# Patient Record
Sex: MALE | Race: WHITE | NOT HISPANIC OR LATINO | Employment: OTHER | URBAN - METROPOLITAN AREA
[De-identification: names, ages, dates, MRNs, and addresses within clinical notes are randomized per-mention and may not be internally consistent; named-entity substitution may affect disease eponyms.]

---

## 2024-02-05 ENCOUNTER — TRANSFERRED RECORDS (OUTPATIENT)
Dept: HEALTH INFORMATION MANAGEMENT | Facility: CLINIC | Age: 64
End: 2024-02-05

## 2024-02-05 LAB
ALT SERPL-CCNC: 23 U/L
AST SERPL-CCNC: 22 U/L (ref 17–59)
CHOLESTEROL (EXTERNAL): 236 MG/DL (ref 0–200)
CREATININE (EXTERNAL): 0.6 MG/DL (ref 0.7–1.4)
GFR ESTIMATED (EXTERNAL): >60 ML/MIN/1.73M2
GLUCOSE (EXTERNAL): 106 MG/DL (ref 60–99)
HDLC SERPL-MCNC: 54 MG/DL (ref 35–65)
LDL CHOLESTEROL CALCULATED (EXTERNAL): 147 MG/DL (ref 0–130)
POTASSIUM (EXTERNAL): 4.2 MMOL/L (ref 3.5–5.1)
TRIGLYCERIDES (EXTERNAL): 173 MG/DL (ref 0–200)
TSH SERPL-ACNC: 1.16 MIU/L (ref 0.47–4.68)

## 2024-02-15 ENCOUNTER — MEDICAL CORRESPONDENCE (OUTPATIENT)
Dept: SCHEDULING | Facility: CLINIC | Age: 64
End: 2024-02-15
Payer: COMMERCIAL

## 2024-02-20 ENCOUNTER — TELEPHONE (OUTPATIENT)
Dept: INTERVENTIONAL RADIOLOGY/VASCULAR | Facility: HOSPITAL | Age: 64
End: 2024-02-20
Payer: COMMERCIAL

## 2024-02-20 NOTE — TELEPHONE ENCOUNTER
INTERVENTIONAL RADIOLOGY INSTRUCTIONS     You are scheduled for an upcoming procedure in the   Interventional Radiology Department at [facility].     Date:  Friday March 8     Procedure: MRI with IV Sedation     Address: [place] [address]     Free parking is available for patients & visitors in Lot A or B.  Lot A is closest to the main entrance of hospital.    Check in at main entrance WELCOME DESK.        Check into the Radiology Department at: 07:00 am    On the date of procedure, please do not eat any food after: 11:00 pm    On the date of this procedure, you may drink clear liquids until 05:00 am     Clear liquid examples are: water, apple juice, black coffee or tea (no cream, sugar or milk), Gatorade & Jello.        You will need to have someone available to drive you home.     A ride share service ([employer], [employer]) does not qualify for transportation home unless you have another adult accompanying you home.    We recommend that you do not not drive, or operate heavy machinery for 24 hours after receiving sedation for this procedure.     We recommend that you have a responsible adult stay with you for 24 hours after you get home.    Please bring list of current medications to your appointment.    Please review your current medications with your primary care provider and follow the advice given for holding any medications prior to this procedure.    You are required to have a pre-operative physical exam (H&P) by a healthcare provider in the 30 day period prior to your scheduled procedure.  Please contact your primary care team for this appointment.  If you are planning on having this pre-operative exam done outside of [facility], please fax the documentation to [phone].    Please confirm that you have received these instructions by replying to this [facility] [employer] message, or if you have any  questions, please call the Interventional Radiology team at [phone].       Thank you!    Kelsi WILLARD  Interventional Radiology Intake Nurse Coordinator  [phone]

## 2024-02-20 NOTE — TELEPHONE ENCOUNTER
Writer has spoken with Yash regarding planned procedure with IR via telephone.      Yash acknowledges understanding of pre-procedure instructions.     He states he has 1 known allergy to WELLBUTRIN.  He is not currently prescribed medications for diabetes.    I have provided Yash with IR number ([phone]) for questions or concerns.    Yash will pursue scheduling the required pre-operative physical and have that documentation faxed to IR prior to procedure date on 3/8/2024.    Kelsi WILLARD  Interventional Radiology RN   [phone]

## 2024-02-23 NOTE — TELEPHONE ENCOUNTER
Patient called, concerned he hasn't received mailed instructions for MRI with sedation that were sent 2/20/24.    Reviewed pre-procedure instructions over the phone. All questions answered. His pre-op is scheduled 2/26/24 and has our fax number to send a copy once completed.      Julisa Swanson RN  Interventional Radiology  [phone]

## 2024-02-26 ENCOUNTER — TRANSFERRED RECORDS (OUTPATIENT)
Dept: HEALTH INFORMATION MANAGEMENT | Facility: CLINIC | Age: 64
End: 2024-02-26
Payer: COMMERCIAL

## 2024-03-06 ENCOUNTER — TRANSFERRED RECORDS (OUTPATIENT)
Dept: HEALTH INFORMATION MANAGEMENT | Facility: CLINIC | Age: 64
End: 2024-03-06
Payer: COMMERCIAL

## 2024-03-08 ENCOUNTER — HOSPITAL ENCOUNTER (OUTPATIENT)
Dept: MRI IMAGING | Facility: HOSPITAL | Age: 64
Discharge: HOME | End: 2024-03-08
Payer: MEDICARE

## 2024-03-08 VITALS
DIASTOLIC BLOOD PRESSURE: 70 MMHG | SYSTOLIC BLOOD PRESSURE: 114 MMHG | OXYGEN SATURATION: 95 % | HEIGHT: 73 IN | RESPIRATION RATE: 14 BRPM | BODY MASS INDEX: 38.43 KG/M2 | WEIGHT: 290 LBS | TEMPERATURE: 97.9 F | HEART RATE: 76 BPM

## 2024-03-08 DIAGNOSIS — M54.50 LOW BACK PAIN: ICD-10-CM

## 2024-03-08 DIAGNOSIS — M48.061 SPINAL STENOSIS OF LUMBAR REGION: ICD-10-CM

## 2024-03-08 PROCEDURE — 99152 MOD SED SAME PHYS/QHP 5/>YRS: CPT

## 2024-03-08 PROCEDURE — 250N000011 HC RX IP 250 OP 636: Performed by: RADIOLOGY

## 2024-03-08 PROCEDURE — 99153 MOD SED SAME PHYS/QHP EA: CPT

## 2024-03-08 RX ORDER — OXYCODONE HYDROCHLORIDE 5 MG/1
5 TABLET ORAL EVERY 4 HOURS
COMMUNITY

## 2024-03-08 RX ORDER — IBUPROFEN 400 MG/1
600 TABLET, FILM COATED ORAL EVERY 6 HOURS PRN
COMMUNITY

## 2024-03-08 RX ORDER — NALOXONE HYDROCHLORIDE 0.4 MG/ML
0.4 INJECTION, SOLUTION INTRAMUSCULAR; INTRAVENOUS; SUBCUTANEOUS
Status: DISCONTINUED | OUTPATIENT
Start: 2024-03-08 | End: 2024-03-09 | Stop reason: HOSPADM

## 2024-03-08 RX ORDER — ACETAMINOPHEN 325 MG/1
325-650 TABLET ORAL EVERY 4 HOURS PRN
COMMUNITY

## 2024-03-08 RX ORDER — GABAPENTIN 300 MG/1
600 CAPSULE ORAL AT BEDTIME
COMMUNITY
Start: 2024-02-29 | End: 2024-05-28

## 2024-03-08 RX ORDER — NALOXONE HYDROCHLORIDE 0.4 MG/ML
0.2 INJECTION, SOLUTION INTRAMUSCULAR; INTRAVENOUS; SUBCUTANEOUS
Status: DISCONTINUED | OUTPATIENT
Start: 2024-03-08 | End: 2024-03-09 | Stop reason: HOSPADM

## 2024-03-08 RX ORDER — FENTANYL CITRATE 50 UG/ML
25-50 INJECTION, SOLUTION INTRAMUSCULAR; INTRAVENOUS EVERY 5 MIN PRN
Status: DISCONTINUED | OUTPATIENT
Start: 2024-03-08 | End: 2024-03-09 | Stop reason: HOSPADM

## 2024-03-08 RX ORDER — FLUMAZENIL 0.1 MG/ML
0.2 INJECTION, SOLUTION INTRAVENOUS
Status: DISCONTINUED | OUTPATIENT
Start: 2024-03-08 | End: 2024-03-09 | Stop reason: HOSPADM

## 2024-03-08 RX ADMIN — MIDAZOLAM HYDROCHLORIDE 3.5 MG: 1 INJECTION, SOLUTION INTRAMUSCULAR; INTRAVENOUS at 10:43

## 2024-03-08 RX ADMIN — FENTANYL CITRATE 125 MCG: 50 INJECTION, SOLUTION INTRAMUSCULAR; INTRAVENOUS at 10:42

## 2024-03-08 NOTE — PRE-PROCEDURE
GENERAL PRE-PROCEDURE:   Procedure:  Moderate sedation for MRI  Date/Time:  3/8/2024 9:26 AM    Verbal consent obtained?: Yes    Written consent obtained?: Yes    Risks and benefits: Risks, benefits and alternatives were discussed    DC Plan: Appropriate discharge home plan in place for patients who are going home after procedure   Consent given by:  Patient  Patient states understanding of procedure being performed: Yes    Patient's understanding of procedure matches consent: Yes    Procedure consent matches procedure scheduled: Yes    Expected level of sedation:  Moderate  Appropriately NPO:  Yes  ASA Class:  2  Mallampati  :  Grade 1- soft palate, uvula, tonsillar pillars, and posterior pharyngeal wall visible  Lungs:  Lungs clear with good breath sounds bilaterally  Heart:  Normal heart sounds and rate  History & Physical reviewed:  History and physical reviewed and no updates needed  Statement of review:  I have reviewed the lab findings, diagnostic data, medications, and the plan for sedation

## 2024-03-08 NOTE — DISCHARGE INSTRUCTIONS
* Recovery After Conscious Sedation (Adult)  We gave you medicine by vein to make you sleepy or relaxed during your procedure. This may have included both a pain medicine and sleeping medicine. Most of the effects have worn off. But you may still feel sleepy for the next 6 to 8 hours.  Home care  Follow these guidelines when you get home:  You may feel sleepy and clumsy and have poor balance for the next few hours.  A responsible adult should stay with you for the next 8 hours. This person should make sure your condition doesn t get worse.  Don't drink any alcohol for the next 24 hours.  Don't drive, operate dangerous machinery, make important business or personal decisions or sign legal documents during the next 24 hours.  You may vomit (throw up) if you eat too soon after the procedure. If this happens, drink small amounts of water, juice or clear broth. Wait to try solid food until you no longer have nausea (upset stomach).  Note: Your care team may tell you not to take any medicine by mouth for pain or sleep in the next 4 hours. These medicines may react with the medicines you had in the hospital. This could cause a much stronger response than usual.  Follow-up care  Follow up with your care team if you are not alert and back to your usual level of activity within 12 hours.  When to seek medical advice  Call your care team right away if any of these occur:  You still feel sleepy or clumsy after 12 hours, or your sleepiness gets worse  Weakness or dizziness gets worse  Repeated vomiting  If you can't be woken up and someone is staying with you, they should call 911.  For informational purposes only. Not to replace the advice of your health care provider.  Copyright   2018 [employer]. All rights reserved.

## 2024-03-08 NOTE — LETTER
February 20, 2024    Yash Ribera  [address] [address]      INTERVENTIONAL RADIOLOGY INSTRUCTIONS     You are scheduled for an upcoming procedure in the   Interventional Radiology Department at [facility].     Date:  Friday March 8     Procedure: MRI with IV Sedation     Address: [place] [address]             Free parking is available for patients & visitors in Lot A or B.  Lot A is closest to the main entrance of hospital.     Check in at main entrance WELCOME DESK.          Check into the Radiology Department at: 07:00 am     On the date of procedure, please do not eat any food after: 11:00 pm     On the date of this procedure, you may drink clear liquids until 05:00 am      Clear liquid examples are: water, apple juice, black coffee or tea (no cream, sugar or milk), Gatorade & Jello.           You will need to have someone available to drive you home.      A ride share service ([employer], [employer], [employer]) does not qualify for transportation home unless you have another adult accompanying you home.     We recommend that you do not not drive, or operate heavy machinery for 24 hours after receiving sedation for this procedure.      We recommend that you have a responsible adult stay with you for 24 hours after you get home.     Please bring list of current medications to your appointment.     Please review your current medications with your primary care provider and follow the advice given for holding any medications prior to this procedure.     You are required to have a pre-operative physical exam (H&P) by a healthcare provider in the 30 day period prior to your scheduled procedure.  Please contact your primary care team for this appointment.  If you are planning on having this pre-operative exam done outside of [facility], please fax the documentation to [phone].      Please confirm that you have received these instructions by replying to this [employer] message, or if you have any questions, please call the Interventional Radiology team at [phone].        Thank you!     Kelsi WILLARD  Interventional Radiology Intake Nurse Coordinator  [phone]

## 2024-03-19 ENCOUNTER — MEDICAL CORRESPONDENCE (OUTPATIENT)
Dept: HEALTH INFORMATION MANAGEMENT | Facility: CLINIC | Age: 64
End: 2024-03-19
Payer: COMMERCIAL

## 2024-03-31 ENCOUNTER — HEALTH MAINTENANCE LETTER (OUTPATIENT)
Age: 64
End: 2024-03-31

## 2024-04-03 ENCOUNTER — OFFICE VISIT (OUTPATIENT)
Dept: CARDIOLOGY | Facility: CLINIC | Age: 64
End: 2024-04-03
Payer: COMMERCIAL

## 2024-04-03 VITALS
DIASTOLIC BLOOD PRESSURE: 76 MMHG | HEIGHT: 73 IN | SYSTOLIC BLOOD PRESSURE: 120 MMHG | RESPIRATION RATE: 14 BRPM | BODY MASS INDEX: 39.36 KG/M2 | WEIGHT: 297 LBS | HEART RATE: 74 BPM

## 2024-04-03 DIAGNOSIS — I47.10 PAROXYSMAL SUPRAVENTRICULAR TACHYCARDIA (H): ICD-10-CM

## 2024-04-03 DIAGNOSIS — R06.09 EXERTIONAL DYSPNEA: ICD-10-CM

## 2024-04-03 DIAGNOSIS — R07.9 CHEST PAIN, UNSPECIFIED TYPE: ICD-10-CM

## 2024-04-03 DIAGNOSIS — I47.29 NSVT (NONSUSTAINED VENTRICULAR TACHYCARDIA) (H): Primary | ICD-10-CM

## 2024-04-03 DIAGNOSIS — Z91.041 CONTRAST MEDIA ALLERGY: ICD-10-CM

## 2024-04-03 DIAGNOSIS — R94.31 NONSPECIFIC ABNORMAL ELECTROCARDIOGRAM (ECG) (EKG): ICD-10-CM

## 2024-04-03 PROCEDURE — 99204 OFFICE O/P NEW MOD 45 MIN: CPT | Performed by: INTERNAL MEDICINE

## 2024-04-03 RX ORDER — METHYLPREDNISOLONE 32 MG/1
32 TABLET ORAL DAILY
Qty: 1 TABLET | Refills: 0 | Status: SHIPPED | OUTPATIENT
Start: 2024-04-03

## 2024-04-03 RX ORDER — METOPROLOL SUCCINATE 25 MG/1
25 TABLET, EXTENDED RELEASE ORAL DAILY
Qty: 90 TABLET | Refills: 3 | Status: SHIPPED | OUTPATIENT
Start: 2024-04-03

## 2024-04-03 RX ORDER — DIPHENHYDRAMINE HCL 50 MG
50 CAPSULE ORAL EVERY 6 HOURS PRN
Qty: 1 CAPSULE | Refills: 1 | Status: SHIPPED | OUTPATIENT
Start: 2024-04-03

## 2024-04-03 RX ORDER — DIPHENHYDRAMINE HCL 25 MG
25 CAPSULE ORAL EVERY 6 HOURS PRN
Qty: 1 CAPSULE | Refills: 1 | Status: SHIPPED | OUTPATIENT
Start: 2024-04-03

## 2024-04-03 NOTE — PROGRESS NOTES
"  HEART CARE ENCOUNTER CONSULTATON NOTE      [facility]  [phone]      Assessment/Recommendations   Assessment:   Chest pain  2.  Exertional dyspnea  3.  Nonsustained ventricular tachycardia on monitor 4 beats  4.  Atrial tachycardia, short duration of monitor  5.  Possible allergy to contrast media  6.  Abnormal EKG  7.  Symptomatic PVCs  8.  Chronic back pain, requiring use of a wheeled walker    Plan:   Recommend coronary angiogram to assess for obstructive coronary artery disease given dyspnea exertion and chest discomfort  2.  Recommend complete echocardiogram to assess left ventricular function given nonsustained ventricular tachycardia and dyspnea on exertion.  3.  Start metoprolol 25 mg daily for symptomatic PVCs and NSVT  4.  Pretreat with Medrol and Benadryl prior to CT coronary       History of Present Illness/Subjective    HPI: Yash Ribera is a 63 year old male who presents to cardiology clinic with palpitations, dyspnea exertion and intermittent chest discomfort.    For the past few months he been noticing increasing episodes of palpitations and occasional chest discomfort associated with dyspnea on exertion.  Patient has a very low functional status due to lower back pain and needs to use a wheeled walker.  With activity he notes dyspnea on exertion, palpitations and occasional chest discomfort.    No prior history of coronary artery disease.    Patient does note some mild lower extremity edema.  Denies any significant orthopnea symptoms.    Echocardiogram Results: Pending       Physical Examination  Review of Systems   Vitals: /76 (BP Location: Right arm, Patient Position: Sitting, Cuff Size: Adult Large)   Pulse 74   Resp 14   Ht 1.854 m (6' 1\")   Wt 134.7 kg (297 lb)   BMI 39.18 kg/m    BMI= Body mass index is 39.18 kg/m .  Wt Readings from Last 3 Encounters:   04/03/24 134.7 kg (297 lb)   03/08/24 131.5 kg (290 lb)        Pleasant, using wheeled walker, " obesity noted   ENT/Mouth: membranes moist, no oral lesions or bleeding gums.      EYES:  no scleral icterus, normal conjunctivae       Chest/Lungs:   lungs are clear to auscultation, no rales or wheezing, no sternal scar, equal chest wall expansion    Cardiovascular:   Regular. Normal first and second heart sounds with no murmurs, rubs, or gallops; the carotid, radial and posterior tibial pulses are intact, Jugular venous pressure normal, mild edema bilaterally    Abdomen:  no tenderness; bowel sounds are present   Extremities: no cyanosis or clubbing   Skin: no xanthelasma, warm.    Neurologic: normal  bilateral, no tremors     Psychiatric: alert and oriented x3, calm        Please refer above for cardiac ROS details.        Medical History  Surgical History Family History Social History   Past Medical History:   Diagnosis Date    Back pain     Gastroesophageal reflux disease with esophagitis      Past Surgical History:   Procedure Laterality Date    BACK SURGERY      NECK SURGERY      ORTHOPEDIC SURGERY       No family history on file.     Social History     Socioeconomic History    Marital status:      Spouse name: Not on file    Number of children: Not on file    Years of education: Not on file    Highest education level: Not on file   Occupational History    Not on file   Tobacco Use    Smoking status: Former     Types: Cigarettes    Smokeless tobacco: Never   Vaping Use    Vaping Use: Never used   Substance and Sexual Activity    Alcohol use: Yes     Comment: 3 times/week    Drug use: Never    Sexual activity: Not on file   Other Topics Concern    Not on file   Social History Narrative    Not on file     Social Determinants of Health     Financial Resource Strain: Not on file   Food Insecurity: Not on file   Transportation Needs: Not on file   Physical Activity: Not on file   Stress: Not on file   Social Connections: Not on file   Interpersonal Safety: Not on file   Housing Stability: Not on file  "          Medications  Allergies   Current Outpatient Medications   Medication Sig Dispense Refill    acetaminophen (TYLENOL) 325 MG tablet Take 325-650 mg by mouth every 4 hours as needed for pain      aspirin-acetaminophen-caffeine (EXCEDRIN MIGRAINE) 250-250-65 MG tablet Take 2 tablets by mouth daily as needed      diphenhydrAMINE (BENADRYL ALLERGY) 25 MG capsule Take 1 capsule (25 mg) by mouth every 6 hours as needed for itching or allergies Administer 30 min - 2 hours pre - IV contrast injection and Patient must have a . 1 capsule 1    diphenhydrAMINE (BENADRYL) 50 MG capsule Take 1 capsule (50 mg) by mouth every 6 hours as needed for itching or allergies Administer 1 hour pre - IV contrast injection and patient must have a . 1 capsule 1    gabapentin (NEURONTIN) 300 MG capsule Take 600 mg by mouth at bedtime      ibuprofen (ADVIL/MOTRIN) 400 MG tablet Take 600 mg by mouth every 6 hours as needed      methylPREDNISolone (MEDROL) 32 MG tablet Take 1 tablet (32 mg) by mouth daily 12 hours prior to the procedure with IV contrast 1 tablet 0    methylPREDNISolone (MEDROL) 32 MG tablet Take 1 tablet (32 mg) by mouth daily 2 hours prior to the procedure with IV contrast 1 tablet 0    metoprolol succinate ER (TOPROL XL) 25 MG 24 hr tablet Take 1 tablet (25 mg) by mouth daily 90 tablet 3    oxyCODONE (ROXICODONE) 5 MG tablet Take 5 mg by mouth every 4 hours         Allergies   Allergen Reactions    Wellbutrin [Bupropion] Hives    Bee Venom      Other Reaction(s): Edema,generalized    Bees     Iodinated Contrast Media Other (See Comments)    Pollen Extract     Shrimp     Shrimp (Diagnostic) Unknown          Lab Results    Chemistry/lipid CBC Cardiac Enzymes/BNP/TSH/INR   No results for input(s): \"CHOL\", \"HDL\", \"LDL\", \"TRIG\", \"CHOLHDLRATIO\" in the last 41279 hours.  No results for input(s): \"LDL\" in the last 34346 hours.  No results for input(s): \"NA\", \"POTASSIUM\", \"CHLORIDE\", \"CO2\", \"GLC\", \"BUN\", \"CR\", " "\"GFRESTIMATED\", \"DAIN\" in the last 50529 hours.    Invalid input(s): \"GRFESTBLACK\"  No results for input(s): \"CR\" in the last 70903 hours.  No results for input(s): \"A1C\" in the last 60855 hours.       No results for input(s): \"WBC\", \"HGB\", \"HCT\", \"MCV\", \"PLT\" in the last 90867 hours.  No results for input(s): \"HGB\" in the last 84764 hours. No results for input(s): \"TROPONINI\" in the last 37974 hours.  No results for input(s): \"BNP\", \"NTBNPI\", \"NTBNP\" in the last 98656 hours.  No results for input(s): \"TSH\" in the last 72978 hours.  No results for input(s): \"INR\" in the last 85484 hours.     Travis Alexandre DO      Today's clinic visit entailed:  45 minutes spent by me on the date of the encounter doing chart review, history and exam, documentation and further activities per the note                            "

## 2024-04-03 NOTE — PATIENT INSTRUCTIONS
Please contact direct nurses line Monday through Friday 8 AM to 5 PM @ [phone]    After-hours contact cardiology office at [phone].    Plan:    Coronary CT angiogram    Echocardiogram    Start metoprolol for non-sustained ventricular tachycardia and Svt.

## 2024-04-03 NOTE — LETTER
"4/3/2024    Pérez Xavier MD  [facility] [address] [place] [id number]    RE: Yash Ribera       Dear Colleague,     I had the pleasure of seeing Yash Ribera in the [facility].    HEART CARE ENCOUNTER CONSULTATON NOTE      [facility]  [phone]      Assessment/Recommendations   Assessment:   Chest pain  2.  Exertional dyspnea  3.  Nonsustained ventricular tachycardia on monitor 4 beats  4.  Atrial tachycardia, short duration of monitor  5.  Possible allergy to contrast media  6.  Abnormal EKG  7.  Symptomatic PVCs  8.  Chronic back pain, requiring use of a wheeled walker    Plan:   Recommend coronary angiogram to assess for obstructive coronary artery disease given dyspnea exertion and chest discomfort  2.  Recommend complete echocardiogram to assess left ventricular function given nonsustained ventricular tachycardia and dyspnea on exertion.  3.  Start metoprolol 25 mg daily for symptomatic PVCs and NSVT  4.  Pretreat with Medrol and Benadryl prior to CT coronary       History of Present Illness/Subjective    HPI: Yash Ribera is a 63 year old male who presents to cardiology clinic with palpitations, dyspnea exertion and intermittent chest discomfort.    For the past few months he been noticing increasing episodes of palpitations and occasional chest discomfort associated with dyspnea on exertion.  Patient has a very low functional status due to lower back pain and needs to use a wheeled walker.  With activity he notes dyspnea on exertion, palpitations and occasional chest discomfort.    No prior history of coronary artery disease.    Patient does note some mild lower extremity edema.  Denies any significant orthopnea symptoms.    Echocardiogram Results: Pending       Physical Examination  Review of Systems   Vitals: /76 (BP Location: Right arm, Patient Position: Sitting, Cuff Size: Adult Large)   Pulse 74   Resp 14   Ht 1.854 m (6' 1\")  "  Wt 134.7 kg (297 lb)   BMI 39.18 kg/m    BMI= Body mass index is 39.18 kg/m .  Wt Readings from Last 3 Encounters:   04/03/24 134.7 kg (297 lb)   03/08/24 131.5 kg (290 lb)        Pleasant, using wheeled walker, obesity noted   ENT/Mouth: membranes moist, no oral lesions or bleeding gums.      EYES:  no scleral icterus, normal conjunctivae       Chest/Lungs:   lungs are clear to auscultation, no rales or wheezing, no sternal scar, equal chest wall expansion    Cardiovascular:   Regular. Normal first and second heart sounds with no murmurs, rubs, or gallops; the carotid, radial and posterior tibial pulses are intact, Jugular venous pressure normal, mild edema bilaterally    Abdomen:  no tenderness; bowel sounds are present   Extremities: no cyanosis or clubbing   Skin: no xanthelasma, warm.    Neurologic: normal  bilateral, no tremors     Psychiatric: alert and oriented x3, calm        Please refer above for cardiac ROS details.        Medical History  Surgical History Family History Social History   Past Medical History:   Diagnosis Date    Back pain     Gastroesophageal reflux disease with esophagitis      Past Surgical History:   Procedure Laterality Date    BACK SURGERY      NECK SURGERY      ORTHOPEDIC SURGERY       No family history on file.     Social History     Socioeconomic History    Marital status:      Spouse name: Not on file    Number of children: Not on file    Years of education: Not on file    Highest education level: Not on file   Occupational History    Not on file   Tobacco Use    Smoking status: Former     Types: Cigarettes    Smokeless tobacco: Never   Vaping Use    Vaping Use: Never used   Substance and Sexual Activity    Alcohol use: Yes     Comment: 3 times/week    Drug use: Never    Sexual activity: Not on file   Other Topics Concern    Not on file   Social History Narrative    Not on file     Social Determinants of Health     Financial Resource Strain: Not on file   Food  Insecurity: Not on file   Transportation Needs: Not on file   Physical Activity: Not on file   Stress: Not on file   Social Connections: Not on file   Interpersonal Safety: Not on file   Housing Stability: Not on file           Medications  Allergies   Current Outpatient Medications   Medication Sig Dispense Refill    acetaminophen (TYLENOL) 325 MG tablet Take 325-650 mg by mouth every 4 hours as needed for pain      aspirin-acetaminophen-caffeine (EXCEDRIN MIGRAINE) 250-250-65 MG tablet Take 2 tablets by mouth daily as needed      diphenhydrAMINE (BENADRYL ALLERGY) 25 MG capsule Take 1 capsule (25 mg) by mouth every 6 hours as needed for itching or allergies Administer 30 min - 2 hours pre - IV contrast injection and Patient must have a . 1 capsule 1    diphenhydrAMINE (BENADRYL) 50 MG capsule Take 1 capsule (50 mg) by mouth every 6 hours as needed for itching or allergies Administer 1 hour pre - IV contrast injection and patient must have a . 1 capsule 1    gabapentin (NEURONTIN) 300 MG capsule Take 600 mg by mouth at bedtime      ibuprofen (ADVIL/MOTRIN) 400 MG tablet Take 600 mg by mouth every 6 hours as needed      methylPREDNISolone (MEDROL) 32 MG tablet Take 1 tablet (32 mg) by mouth daily 12 hours prior to the procedure with IV contrast 1 tablet 0    methylPREDNISolone (MEDROL) 32 MG tablet Take 1 tablet (32 mg) by mouth daily 2 hours prior to the procedure with IV contrast 1 tablet 0    metoprolol succinate ER (TOPROL XL) 25 MG 24 hr tablet Take 1 tablet (25 mg) by mouth daily 90 tablet 3    oxyCODONE (ROXICODONE) 5 MG tablet Take 5 mg by mouth every 4 hours         Allergies   Allergen Reactions    Wellbutrin [Bupropion] Hives    Bee Venom      Other Reaction(s): Edema,generalized    Bees     Iodinated Contrast Media Other (See Comments)    Pollen Extract     Shrimp     Shrimp (Diagnostic) Unknown          Lab Results    Chemistry/lipid CBC Cardiac Enzymes/BNP/TSH/INR   No results for input(s):  "\"CHOL\", \"HDL\", \"LDL\", \"TRIG\", \"CHOLHDLRATIO\" in the last 39909 hours.  No results for input(s): \"LDL\" in the last 97712 hours.  No results for input(s): \"NA\", \"POTASSIUM\", \"CHLORIDE\", \"CO2\", \"GLC\", \"BUN\", \"CR\", \"GFRESTIMATED\", \"DAIN\" in the last 32521 hours.    Invalid input(s): \"GRFESTBLACK\"  No results for input(s): \"CR\" in the last 71032 hours.  No results for input(s): \"A1C\" in the last 61900 hours.       No results for input(s): \"WBC\", \"HGB\", \"HCT\", \"MCV\", \"PLT\" in the last 13462 hours.  No results for input(s): \"HGB\" in the last 32002 hours. No results for input(s): \"TROPONINI\" in the last 50781 hours.  No results for input(s): \"BNP\", \"NTBNPI\", \"NTBNP\" in the last 11147 hours.  No results for input(s): \"TSH\" in the last 48579 hours.  No results for input(s): \"INR\" in the last 25583 hours.     Travis Alexandre DO      Today's clinic visit entailed:  45 minutes spent by me on the date of the encounter doing chart review, history and exam, documentation and further activities per the note    Thank you for allowing me to participate in the care of your patient.      Sincerely,     Travis Alexandre DO     [facility]  cc:   No referring provider defined for this encounter.      "

## 2024-04-04 ENCOUNTER — TELEPHONE (OUTPATIENT)
Dept: CARDIOLOGY | Facility: CLINIC | Age: 64
End: 2024-04-04

## 2024-04-04 ENCOUNTER — ANCILLARY PROCEDURE (OUTPATIENT)
Dept: CARDIOLOGY | Facility: CLINIC | Age: 64
End: 2024-04-04
Attending: INTERNAL MEDICINE
Payer: COMMERCIAL

## 2024-04-04 DIAGNOSIS — I47.29 NSVT (NONSUSTAINED VENTRICULAR TACHYCARDIA) (H): ICD-10-CM

## 2024-04-04 DIAGNOSIS — R94.31 NONSPECIFIC ABNORMAL ELECTROCARDIOGRAM (ECG) (EKG): ICD-10-CM

## 2024-04-04 DIAGNOSIS — I47.10 PAROXYSMAL SUPRAVENTRICULAR TACHYCARDIA (H): ICD-10-CM

## 2024-04-04 DIAGNOSIS — R06.09 EXERTIONAL DYSPNEA: ICD-10-CM

## 2024-04-04 DIAGNOSIS — R07.9 CHEST PAIN, UNSPECIFIED TYPE: ICD-10-CM

## 2024-04-04 PROCEDURE — 93306 TTE W/DOPPLER COMPLETE: CPT | Performed by: INTERNAL MEDICINE

## 2024-04-04 RX ADMIN — Medication 4 ML: at 15:31

## 2024-04-11 ENCOUNTER — TELEPHONE (OUTPATIENT)
Dept: CARDIOLOGY | Facility: CLINIC | Age: 64
End: 2024-04-11
Payer: COMMERCIAL

## 2024-04-11 DIAGNOSIS — R07.9 CHEST PAIN, UNSPECIFIED TYPE: ICD-10-CM

## 2024-04-11 DIAGNOSIS — R06.09 EXERTIONAL DYSPNEA: ICD-10-CM

## 2024-04-11 DIAGNOSIS — I47.29 NSVT (NONSUSTAINED VENTRICULAR TACHYCARDIA) (H): Primary | ICD-10-CM

## 2024-04-11 DIAGNOSIS — R94.31 NONSPECIFIC ABNORMAL ELECTROCARDIOGRAM (ECG) (EKG): ICD-10-CM

## 2024-04-11 DIAGNOSIS — I47.10 PAROXYSMAL SUPRAVENTRICULAR TACHYCARDIA (H): ICD-10-CM

## 2024-04-11 DIAGNOSIS — Z91.041 CONTRAST MEDIA ALLERGY: ICD-10-CM

## 2024-04-11 NOTE — TELEPHONE ENCOUNTER
STAT order placed.  notified of order priority change. Pre-treatment for dye allergy already prescribed. YOHANNES,Rn

## 2024-04-11 NOTE — TELEPHONE ENCOUNTER
----- Message from Travis Alexandre DO sent at 4/10/2024  4:52 PM CDT -----  Regarding: RE: Bettie - CCTA  Yes order stat  ----- Message -----  From: Matty Smallwood, RN  Sent: 4/10/2024  12:51 PM CDT  To: Travis Alexandre DO  Subject: FW: Bettie - CCTA                               Dr. Alexandre please review. Ok to change order priority to STAT given back pain issues? Thank you Rossi SHEFFIELD  ----- Message -----  From: Letty Lagunas  Sent: 4/9/2024   1:27 PM CDT  To: Matty Smallwood RN  Subject: Bettie - CCTA                                   Hi Felicity,    This pt called and left me a message stating that he is in misery waiting for a back surgery. He is wondering if there is anyway to move up his CCTA (currently scheduled 5/7). We currently do no have any sooner openings. Is this something that Dr. Alexandre wants to order STAT, or keep him on for 5/7? Thanks!

## 2024-04-15 RX ORDER — DILTIAZEM HYDROCHLORIDE 5 MG/ML
5 INJECTION INTRAVENOUS
Status: CANCELLED | OUTPATIENT
Start: 2024-04-15

## 2024-04-15 RX ORDER — DILTIAZEM HYDROCHLORIDE 5 MG/ML
10 INJECTION INTRAVENOUS
Status: CANCELLED | OUTPATIENT
Start: 2024-04-15

## 2024-04-17 NOTE — TELEPHONE ENCOUNTER
PC with patient and review of pre-treatment. Has the medications, and understands process. No further questions. YOHANNES,Rn

## 2024-04-19 ENCOUNTER — MYC MEDICAL ADVICE (OUTPATIENT)
Dept: CARDIOLOGY | Facility: CLINIC | Age: 64
End: 2024-04-19

## 2024-04-19 ENCOUNTER — HOSPITAL ENCOUNTER (OUTPATIENT)
Dept: CT IMAGING | Facility: CLINIC | Age: 64
Discharge: HOME | End: 2024-04-19
Attending: INTERNAL MEDICINE | Admitting: INTERNAL MEDICINE
Payer: MEDICARE

## 2024-04-19 VITALS
DIASTOLIC BLOOD PRESSURE: 69 MMHG | HEIGHT: 73 IN | HEART RATE: 71 BPM | BODY MASS INDEX: 39.36 KG/M2 | SYSTOLIC BLOOD PRESSURE: 117 MMHG | WEIGHT: 297 LBS

## 2024-04-19 DIAGNOSIS — R94.31 NONSPECIFIC ABNORMAL ELECTROCARDIOGRAM (ECG) (EKG): ICD-10-CM

## 2024-04-19 DIAGNOSIS — I47.10 PAROXYSMAL SUPRAVENTRICULAR TACHYCARDIA (H): ICD-10-CM

## 2024-04-19 DIAGNOSIS — Z91.041 CONTRAST MEDIA ALLERGY: ICD-10-CM

## 2024-04-19 DIAGNOSIS — R07.9 CHEST PAIN, UNSPECIFIED TYPE: ICD-10-CM

## 2024-04-19 DIAGNOSIS — R06.09 EXERTIONAL DYSPNEA: ICD-10-CM

## 2024-04-19 DIAGNOSIS — I47.29 NSVT (NONSUSTAINED VENTRICULAR TACHYCARDIA) (H): ICD-10-CM

## 2024-04-19 LAB
BSA FOR ECHO PROCEDURE: 0 M2
CREAT BLD-MCNC: 0.9 MG/DL (ref 0.7–1.3)
EGFRCR SERPLBLD CKD-EPI 2021: >60 ML/MIN/1.73M2

## 2024-04-19 PROCEDURE — 250N000009 HC RX 250: Performed by: INTERNAL MEDICINE

## 2024-04-19 PROCEDURE — G1010 CDSM STANSON: HCPCS | Performed by: STUDENT IN AN ORGANIZED HEALTH CARE EDUCATION/TRAINING PROGRAM

## 2024-04-19 PROCEDURE — 82565 ASSAY OF CREATININE: CPT

## 2024-04-19 PROCEDURE — 75574 CT ANGIO HRT W/3D IMAGE: CPT | Mod: 26 | Performed by: STUDENT IN AN ORGANIZED HEALTH CARE EDUCATION/TRAINING PROGRAM

## 2024-04-19 PROCEDURE — 250N000013 HC RX MED GY IP 250 OP 250 PS 637: Performed by: INTERNAL MEDICINE

## 2024-04-19 PROCEDURE — 75574 CT ANGIO HRT W/3D IMAGE: CPT | Mod: MF

## 2024-04-19 PROCEDURE — 250N000011 HC RX IP 250 OP 636: Performed by: INTERNAL MEDICINE

## 2024-04-19 RX ORDER — METOPROLOL TARTRATE 1 MG/ML
5 INJECTION, SOLUTION INTRAVENOUS
Status: DISCONTINUED | OUTPATIENT
Start: 2024-04-19 | End: 2024-04-20 | Stop reason: HOSPADM

## 2024-04-19 RX ORDER — NITROGLYCERIN 0.4 MG/1
0.4 TABLET SUBLINGUAL ONCE
Status: COMPLETED | OUTPATIENT
Start: 2024-04-19 | End: 2024-04-19

## 2024-04-19 RX ORDER — IOPAMIDOL 755 MG/ML
100 INJECTION, SOLUTION INTRAVASCULAR ONCE
Status: COMPLETED | OUTPATIENT
Start: 2024-04-19 | End: 2024-04-19

## 2024-04-19 RX ADMIN — NITROGLYCERIN 0.4 MG: 0.4 TABLET SUBLINGUAL at 12:02

## 2024-04-19 RX ADMIN — IOPAMIDOL 99 ML: 755 INJECTION, SOLUTION INTRAVENOUS at 12:14

## 2024-04-19 RX ADMIN — METOPROLOL TARTRATE 5 MG: 1 INJECTION, SOLUTION INTRAVENOUS at 12:00

## 2024-05-03 NOTE — TELEPHONE ENCOUNTER
Dr. Alexandre, please speak to the following:    Patient would also like discussion of the diagram drawn at time of OV. Numbers given correlation to the 4 different chambers of the heart. What was this in reference to?   Is he CV risk optimized to proceed with back surgery with general anesthesia? Or need Endocrinology work-up/evaluation first? Thank you RADHA SHEFFIELD   ____________________________________________________________________________  PC with patient and review of provider response.   Lengthy phone conversation >30 minutes to review and discuss each test individually so that patient understands each test, rationale, and findings. Again, encouraged to follow though with Endocrinology as the heart rhythm could be adrenal gland related and also to stress. Acknowledged pt fears, and reassurance provided. Pt reports that his PCP placed an Endocrinology referral. Awaiting to schedule. Encouraged to continue medications such as Metoprolol. Pt would like to know what follow-up recommendations are in place. Encouraged to give more time for the medication to work. Give at least 3 months time for medication to give full effect of medication. If continues to have palpitations, can update cardiology to see if BB needs to be increased. Encouraged to get an Endocrinology appt to be able to have full evaluation and plan of care. Finally, pt would like to know is cardiology ok to proceed with a general anesthesia so he can have his back improved? Informed patient will send to [facility] for review and return call with further discussion. RADHA SHEFFIELD

## 2024-05-03 NOTE — TELEPHONE ENCOUNTER
===View-only below this line===  ----- Message -----  From: Travis Alexandre DO  Sent: 5/1/2024  12:47 PM CDT  To: Matty Smallwood RN  Subject: FW: CT coronary angiogram                        Can we call the patient is unclear if he understands test results.  His coronary CT angiogram demonstrated no evidence of coronary artery disease.  Echocardiogram demonstrated normal heart function.  No further testing is needed at this time for nonsustained ventricular tachycardia noted on monitor.    He should be evaluated by endocrinology.  Neck commend sending referral to endocrinology at [facility] or [facility] endocrinology team.  Indication is adrenal adenoma.    ThanksAram

## 2024-05-06 NOTE — TELEPHONE ENCOUNTER
===View-only below this line===  ----- Message -----  From: Travis Alexandre DO  Sent: 5/3/2024   3:55 PM CDT  To: Matty Smallwood RN  Subject: FW: CT coronary angiogram                        Cardiac workup has been normal.  With cardiovascular standpoint he can proceed with surgery with no further workup.  I would recommend that he waits till endocrinology evaluation before undergoing general anesthesia.    Thanks, Dr. Alexandre    PC with patient and review of provider response. Highly stressed to have endocrinology evaluation as discussed. Pt still awaiting to have a call to schedule an Endocrinology appointment. Encouraged to reach out to his PCP whom placed the referral, to see if can facilitate a soonest appt. Will route to provider also. Just informed patient, not confident the message will be received. CMM,Rn

## 2024-05-06 NOTE — TELEPHONE ENCOUNTER
Good day Duc,  Please review message from cardiology. Ok per cardiology, except would recommend Endocrinology evaluation first. Pt reports referral from your office. Could you please help pt to facilitate a soonest appt with Endocrinology? I have encouraged Mr. Ribera to reach out also. YOHANNES,RN

## 2024-10-21 ENCOUNTER — HOSPITAL ENCOUNTER (OUTPATIENT)
Dept: MRI IMAGING | Facility: HOSPITAL | Age: 64
Discharge: HOME | End: 2024-10-21
Attending: ORTHOPAEDIC SURGERY | Admitting: ORTHOPAEDIC SURGERY
Payer: MEDICARE

## 2024-10-21 DIAGNOSIS — M54.2 NECK PAIN: ICD-10-CM

## 2024-10-21 PROCEDURE — 72141 MRI NECK SPINE W/O DYE: CPT

## 2024-10-29 ENCOUNTER — TRANSFERRED RECORDS (OUTPATIENT)
Dept: HEALTH INFORMATION MANAGEMENT | Facility: CLINIC | Age: 64
End: 2024-10-29
Payer: COMMERCIAL

## 2024-11-25 ENCOUNTER — TRANSFERRED RECORDS (OUTPATIENT)
Dept: HEALTH INFORMATION MANAGEMENT | Facility: CLINIC | Age: 64
End: 2024-11-25

## 2024-12-12 RX ORDER — IPRATROPIUM BROMIDE AND ALBUTEROL SULFATE 2.5; .5 MG/3ML; MG/3ML
1 SOLUTION RESPIRATORY (INHALATION) EVERY 6 HOURS PRN
COMMUNITY

## 2024-12-13 ENCOUNTER — ANESTHESIA EVENT (OUTPATIENT)
Dept: SURGERY | Facility: CLINIC | Age: 64
End: 2024-12-13
Payer: MEDICARE

## 2024-12-16 ENCOUNTER — APPOINTMENT (OUTPATIENT)
Dept: RADIOLOGY | Facility: CLINIC | Age: 64
DRG: 402 | End: 2024-12-16
Attending: ORTHOPAEDIC SURGERY
Payer: MEDICARE

## 2024-12-16 ENCOUNTER — HOSPITAL ENCOUNTER (INPATIENT)
Facility: CLINIC | Age: 64
DRG: 402 | End: 2024-12-16
Attending: ORTHOPAEDIC SURGERY | Admitting: ORTHOPAEDIC SURGERY
Payer: MEDICARE

## 2024-12-16 ENCOUNTER — ANESTHESIA (OUTPATIENT)
Dept: SURGERY | Facility: CLINIC | Age: 64
End: 2024-12-16
Payer: MEDICARE

## 2024-12-16 DIAGNOSIS — M54.16 LUMBAR RADICULOPATHY: Primary | ICD-10-CM

## 2024-12-16 LAB
GLUCOSE BLDC GLUCOMTR-MCNC: 125 MG/DL (ref 70–99)
HOLD SPECIMEN: NORMAL

## 2024-12-16 PROCEDURE — 250N000005 HC OR RX SURGIFLO HEMOSTATIC MATRIX 10ML 199102S OPNP: Performed by: ORTHOPAEDIC SURGERY

## 2024-12-16 PROCEDURE — 272N000001 HC OR GENERAL SUPPLY STERILE: Performed by: ORTHOPAEDIC SURGERY

## 2024-12-16 PROCEDURE — 0ST20ZZ RESECTION OF LUMBAR VERTEBRAL DISC, OPEN APPROACH: ICD-10-PCS | Performed by: ORTHOPAEDIC SURGERY

## 2024-12-16 PROCEDURE — 710N000010 HC RECOVERY PHASE 1, LEVEL 2, PER MIN: Performed by: ORTHOPAEDIC SURGERY

## 2024-12-16 PROCEDURE — 999N000182 XR SURGERY CARM FLUORO GREATER THAN 5 MIN: Mod: TC

## 2024-12-16 PROCEDURE — 0SG0071 FUSION OF LUMBAR VERTEBRAL JOINT WITH AUTOLOGOUS TISSUE SUBSTITUTE, POSTERIOR APPROACH, POSTERIOR COLUMN, OPEN APPROACH: ICD-10-PCS | Performed by: ORTHOPAEDIC SURGERY

## 2024-12-16 PROCEDURE — 01NB0ZZ RELEASE LUMBAR NERVE, OPEN APPROACH: ICD-10-PCS | Performed by: ORTHOPAEDIC SURGERY

## 2024-12-16 PROCEDURE — 360N000086 HC SURGERY LEVEL 6 W/ FLUORO, PER MIN: Performed by: ORTHOPAEDIC SURGERY

## 2024-12-16 PROCEDURE — 999N000182 XR SURGERY OARM: Mod: TC

## 2024-12-16 PROCEDURE — 250N000011 HC RX IP 250 OP 636: Performed by: PHYSICIAN ASSISTANT

## 2024-12-16 PROCEDURE — 258N000003 HC RX IP 258 OP 636: Performed by: ORTHOPAEDIC SURGERY

## 2024-12-16 PROCEDURE — 370N000017 HC ANESTHESIA TECHNICAL FEE, PER MIN: Performed by: ORTHOPAEDIC SURGERY

## 2024-12-16 PROCEDURE — 250N000013 HC RX MED GY IP 250 OP 250 PS 637: Performed by: PHYSICIAN ASSISTANT

## 2024-12-16 PROCEDURE — 8E0WXBF COMPUTER ASSISTED PROCEDURE OF TRUNK REGION, WITH FLUOROSCOPY: ICD-10-PCS | Performed by: ORTHOPAEDIC SURGERY

## 2024-12-16 PROCEDURE — 250N000009 HC RX 250: Performed by: ORTHOPAEDIC SURGERY

## 2024-12-16 PROCEDURE — 250N000013 HC RX MED GY IP 250 OP 250 PS 637: Performed by: ORTHOPAEDIC SURGERY

## 2024-12-16 PROCEDURE — 00NY0ZZ RELEASE LUMBAR SPINAL CORD, OPEN APPROACH: ICD-10-PCS | Performed by: ORTHOPAEDIC SURGERY

## 2024-12-16 PROCEDURE — 82962 GLUCOSE BLOOD TEST: CPT

## 2024-12-16 PROCEDURE — P9045 ALBUMIN (HUMAN), 5%, 250 ML: HCPCS | Performed by: NURSE ANESTHETIST, CERTIFIED REGISTERED

## 2024-12-16 PROCEDURE — 258N000003 HC RX IP 258 OP 636: Performed by: NURSE ANESTHETIST, CERTIFIED REGISTERED

## 2024-12-16 PROCEDURE — C1762 CONN TISS, HUMAN(INC FASCIA): HCPCS | Performed by: ORTHOPAEDIC SURGERY

## 2024-12-16 PROCEDURE — 250N000011 HC RX IP 250 OP 636: Performed by: NURSE ANESTHETIST, CERTIFIED REGISTERED

## 2024-12-16 PROCEDURE — 250N000011 HC RX IP 250 OP 636: Performed by: ANESTHESIOLOGY

## 2024-12-16 PROCEDURE — 250N000025 HC SEVOFLURANE, PER MIN: Performed by: ORTHOPAEDIC SURGERY

## 2024-12-16 PROCEDURE — 250N000011 HC RX IP 250 OP 636: Performed by: ORTHOPAEDIC SURGERY

## 2024-12-16 PROCEDURE — 99204 OFFICE O/P NEW MOD 45 MIN: CPT | Performed by: STUDENT IN AN ORGANIZED HEALTH CARE EDUCATION/TRAINING PROGRAM

## 2024-12-16 PROCEDURE — 0SG00AJ FUSION OF LUMBAR VERTEBRAL JOINT WITH INTERBODY FUSION DEVICE, POSTERIOR APPROACH, ANTERIOR COLUMN, OPEN APPROACH: ICD-10-PCS | Performed by: ORTHOPAEDIC SURGERY

## 2024-12-16 PROCEDURE — 999N000141 HC STATISTIC PRE-PROCEDURE NURSING ASSESSMENT: Performed by: ORTHOPAEDIC SURGERY

## 2024-12-16 PROCEDURE — 250N000009 HC RX 250: Performed by: NURSE ANESTHETIST, CERTIFIED REGISTERED

## 2024-12-16 PROCEDURE — 4A11X4G MONITORING OF PERIPHERAL NERVOUS ELECTRICAL ACTIVITY, INTRAOPERATIVE, EXTERNAL APPROACH: ICD-10-PCS | Performed by: ORTHOPAEDIC SURGERY

## 2024-12-16 PROCEDURE — 258N000001 HC RX 258: Performed by: ORTHOPAEDIC SURGERY

## 2024-12-16 PROCEDURE — 36415 COLL VENOUS BLD VENIPUNCTURE: CPT | Performed by: ORTHOPAEDIC SURGERY

## 2024-12-16 PROCEDURE — C1713 ANCHOR/SCREW BN/BN,TIS/BN: HCPCS | Performed by: ORTHOPAEDIC SURGERY

## 2024-12-16 DEVICE — SCREW BN PEEK SS MA STRL SPNE 5.5/6 MM ROD 559200029: Type: IMPLANTABLE DEVICE | Site: SPINE LUMBAR | Status: FUNCTIONAL

## 2024-12-16 DEVICE — IMPLANTABLE DEVICE: Type: IMPLANTABLE DEVICE | Site: SPINE LUMBAR | Status: FUNCTIONAL

## 2024-12-16 DEVICE — IMP ROD MEDT SOLERA CVD 5.5X50MM CHR 1555501050: Type: IMPLANTABLE DEVICE | Site: SPINE LUMBAR | Status: FUNCTIONAL

## 2024-12-16 DEVICE — GRAFT BONE OSSDSIGN CATALYST NANOSYNTHETIC 2.5CC PUTTY104025: Type: IMPLANTABLE DEVICE | Site: SPINE LUMBAR | Status: FUNCTIONAL

## 2024-12-16 DEVICE — SPACER 6069076 CATALYFT PL40 LONG 7MM
Type: IMPLANTABLE DEVICE | Site: SPINE LUMBAR | Status: FUNCTIONAL
Brand: CATALYFT PL EXPANDABLE INTERBODY SYSTEM

## 2024-12-16 DEVICE — GRAFT BONE MAGNIFUSE 1.75CMX5CM 7509145: Type: IMPLANTABLE DEVICE | Site: SPINE LUMBAR | Status: FUNCTIONAL

## 2024-12-16 DEVICE — KIT BNGF 6CC DMNR CORT FBR ACCELERATE GRFTN CNN T50206: Type: IMPLANTABLE DEVICE | Site: SPINE LUMBAR | Status: FUNCTIONAL

## 2024-12-16 DEVICE — IMP ROD MEDT SOLERA CVD 5.5X35MM CHR 1555501035: Type: IMPLANTABLE DEVICE | Site: SPINE LUMBAR | Status: FUNCTIONAL

## 2024-12-16 RX ORDER — BUPIVACAINE HYDROCHLORIDE AND EPINEPHRINE 2.5; 5 MG/ML; UG/ML
INJECTION, SOLUTION EPIDURAL; INFILTRATION; INTRACAUDAL; PERINEURAL
Status: DISCONTINUED
Start: 2024-12-16 | End: 2024-12-16 | Stop reason: HOSPADM

## 2024-12-16 RX ORDER — NALOXONE HYDROCHLORIDE 0.4 MG/ML
0.2 INJECTION, SOLUTION INTRAMUSCULAR; INTRAVENOUS; SUBCUTANEOUS
Status: DISCONTINUED | OUTPATIENT
Start: 2024-12-16 | End: 2024-12-20 | Stop reason: HOSPADM

## 2024-12-16 RX ORDER — FENTANYL CITRATE 50 UG/ML
INJECTION, SOLUTION INTRAMUSCULAR; INTRAVENOUS PRN
Status: DISCONTINUED | OUTPATIENT
Start: 2024-12-16 | End: 2024-12-16

## 2024-12-16 RX ORDER — SODIUM CHLORIDE, SODIUM LACTATE, POTASSIUM CHLORIDE, CALCIUM CHLORIDE 600; 310; 30; 20 MG/100ML; MG/100ML; MG/100ML; MG/100ML
INJECTION, SOLUTION INTRAVENOUS CONTINUOUS
Status: DISCONTINUED | OUTPATIENT
Start: 2024-12-16 | End: 2024-12-16 | Stop reason: HOSPADM

## 2024-12-16 RX ORDER — VANCOMYCIN HYDROCHLORIDE 1 G/20ML
1 INJECTION, POWDER, LYOPHILIZED, FOR SOLUTION INTRAVENOUS
Status: DISCONTINUED | OUTPATIENT
Start: 2024-12-16 | End: 2024-12-16 | Stop reason: HOSPADM

## 2024-12-16 RX ORDER — ALBUTEROL SULFATE 90 UG/1
2 INHALANT RESPIRATORY (INHALATION) EVERY 4 HOURS PRN
Status: DISCONTINUED | OUTPATIENT
Start: 2024-12-16 | End: 2024-12-20 | Stop reason: HOSPADM

## 2024-12-16 RX ORDER — NALOXONE HYDROCHLORIDE 0.4 MG/ML
0.4 INJECTION, SOLUTION INTRAMUSCULAR; INTRAVENOUS; SUBCUTANEOUS
Status: DISCONTINUED | OUTPATIENT
Start: 2024-12-16 | End: 2024-12-20 | Stop reason: HOSPADM

## 2024-12-16 RX ORDER — HEPARIN SOD,PORCINE/0.9 % NACL 30K/1000ML
INTRAVENOUS SOLUTION INTRAVENOUS ONCE
Status: DISCONTINUED | OUTPATIENT
Start: 2024-12-16 | End: 2024-12-17

## 2024-12-16 RX ORDER — MULTIVITAMIN,THERAPEUTIC
1 TABLET ORAL DAILY
Status: DISCONTINUED | OUTPATIENT
Start: 2024-12-17 | End: 2024-12-20 | Stop reason: HOSPADM

## 2024-12-16 RX ORDER — FENTANYL CITRATE 50 UG/ML
25 INJECTION, SOLUTION INTRAMUSCULAR; INTRAVENOUS EVERY 5 MIN PRN
Status: DISCONTINUED | OUTPATIENT
Start: 2024-12-16 | End: 2024-12-16 | Stop reason: HOSPADM

## 2024-12-16 RX ORDER — IPRATROPIUM BROMIDE AND ALBUTEROL SULFATE 2.5; .5 MG/3ML; MG/3ML
1 SOLUTION RESPIRATORY (INHALATION) EVERY 6 HOURS PRN
Status: DISCONTINUED | OUTPATIENT
Start: 2024-12-16 | End: 2024-12-20 | Stop reason: HOSPADM

## 2024-12-16 RX ORDER — BISACODYL 10 MG
10 SUPPOSITORY, RECTAL RECTAL DAILY PRN
Status: DISCONTINUED | OUTPATIENT
Start: 2024-12-19 | End: 2024-12-20 | Stop reason: HOSPADM

## 2024-12-16 RX ORDER — ONDANSETRON 4 MG/1
4 TABLET, ORALLY DISINTEGRATING ORAL EVERY 6 HOURS PRN
Status: DISCONTINUED | OUTPATIENT
Start: 2024-12-16 | End: 2024-12-20 | Stop reason: HOSPADM

## 2024-12-16 RX ORDER — ONDANSETRON 4 MG/1
4 TABLET, ORALLY DISINTEGRATING ORAL EVERY 30 MIN PRN
Status: DISCONTINUED | OUTPATIENT
Start: 2024-12-16 | End: 2024-12-16 | Stop reason: HOSPADM

## 2024-12-16 RX ORDER — LIDOCAINE 40 MG/G
CREAM TOPICAL
Status: DISCONTINUED | OUTPATIENT
Start: 2024-12-16 | End: 2024-12-16 | Stop reason: HOSPADM

## 2024-12-16 RX ORDER — HYDROMORPHONE HCL IN WATER/PF 6 MG/30 ML
0.2 PATIENT CONTROLLED ANALGESIA SYRINGE INTRAVENOUS
Status: DISCONTINUED | OUTPATIENT
Start: 2024-12-16 | End: 2024-12-20

## 2024-12-16 RX ORDER — METOPROLOL SUCCINATE 25 MG/1
25 TABLET, EXTENDED RELEASE ORAL DAILY
Status: DISCONTINUED | OUTPATIENT
Start: 2024-12-17 | End: 2024-12-20 | Stop reason: HOSPADM

## 2024-12-16 RX ORDER — ONDANSETRON 2 MG/ML
4 INJECTION INTRAMUSCULAR; INTRAVENOUS EVERY 30 MIN PRN
Status: DISCONTINUED | OUTPATIENT
Start: 2024-12-16 | End: 2024-12-16 | Stop reason: HOSPADM

## 2024-12-16 RX ORDER — SODIUM CHLORIDE 9 MG/ML
INJECTION, SOLUTION INTRAVENOUS CONTINUOUS
Status: DISCONTINUED | OUTPATIENT
Start: 2024-12-16 | End: 2024-12-20 | Stop reason: HOSPADM

## 2024-12-16 RX ORDER — LORATADINE 10 MG/1
10 TABLET ORAL DAILY PRN
Status: DISCONTINUED | OUTPATIENT
Start: 2024-12-16 | End: 2024-12-20 | Stop reason: HOSPADM

## 2024-12-16 RX ORDER — PROCHLORPERAZINE MALEATE 10 MG
10 TABLET ORAL EVERY 6 HOURS PRN
Status: DISCONTINUED | OUTPATIENT
Start: 2024-12-16 | End: 2024-12-20 | Stop reason: HOSPADM

## 2024-12-16 RX ORDER — VANCOMYCIN HYDROCHLORIDE 1 G/20ML
INJECTION, POWDER, LYOPHILIZED, FOR SOLUTION INTRAVENOUS
Status: DISCONTINUED
Start: 2024-12-16 | End: 2024-12-16 | Stop reason: HOSPADM

## 2024-12-16 RX ORDER — FENTANYL CITRATE 50 UG/ML
50 INJECTION, SOLUTION INTRAMUSCULAR; INTRAVENOUS EVERY 5 MIN PRN
Status: DISCONTINUED | OUTPATIENT
Start: 2024-12-16 | End: 2024-12-16 | Stop reason: HOSPADM

## 2024-12-16 RX ORDER — HYDROMORPHONE HCL IN WATER/PF 6 MG/30 ML
0.2 PATIENT CONTROLLED ANALGESIA SYRINGE INTRAVENOUS EVERY 5 MIN PRN
Status: DISCONTINUED | OUTPATIENT
Start: 2024-12-16 | End: 2024-12-16 | Stop reason: HOSPADM

## 2024-12-16 RX ORDER — AMOXICILLIN 250 MG
1 CAPSULE ORAL 2 TIMES DAILY
Status: DISCONTINUED | OUTPATIENT
Start: 2024-12-16 | End: 2024-12-20 | Stop reason: HOSPADM

## 2024-12-16 RX ORDER — CEFAZOLIN SODIUM/WATER 3 G/30 ML
3 SYRINGE (ML) INTRAVENOUS
Status: COMPLETED | OUTPATIENT
Start: 2024-12-16 | End: 2024-12-16

## 2024-12-16 RX ORDER — BUPIVACAINE HYDROCHLORIDE AND EPINEPHRINE 2.5; 5 MG/ML; UG/ML
INJECTION, SOLUTION EPIDURAL; INFILTRATION; INTRACAUDAL; PERINEURAL PRN
Status: DISCONTINUED | OUTPATIENT
Start: 2024-12-16 | End: 2024-12-16 | Stop reason: HOSPADM

## 2024-12-16 RX ORDER — KETAMINE HYDROCHLORIDE 10 MG/ML
INJECTION INTRAMUSCULAR; INTRAVENOUS PRN
Status: DISCONTINUED | OUTPATIENT
Start: 2024-12-16 | End: 2024-12-16

## 2024-12-16 RX ORDER — HYDROMORPHONE HCL IN WATER/PF 6 MG/30 ML
0.4 PATIENT CONTROLLED ANALGESIA SYRINGE INTRAVENOUS
Status: DISCONTINUED | OUTPATIENT
Start: 2024-12-16 | End: 2024-12-20

## 2024-12-16 RX ORDER — ACETAMINOPHEN 325 MG/1
975 TABLET ORAL EVERY 8 HOURS
Status: COMPLETED | OUTPATIENT
Start: 2024-12-16 | End: 2024-12-19

## 2024-12-16 RX ORDER — DEXAMETHASONE SODIUM PHOSPHATE 10 MG/ML
INJECTION, SOLUTION INTRAMUSCULAR; INTRAVENOUS PRN
Status: DISCONTINUED | OUTPATIENT
Start: 2024-12-16 | End: 2024-12-16

## 2024-12-16 RX ORDER — CEFAZOLIN SODIUM 2 G/100ML
2 INJECTION, SOLUTION INTRAVENOUS EVERY 8 HOURS
Status: COMPLETED | OUTPATIENT
Start: 2024-12-16 | End: 2024-12-17

## 2024-12-16 RX ORDER — VANCOMYCIN HYDROCHLORIDE 1 G/20ML
INJECTION, POWDER, LYOPHILIZED, FOR SOLUTION INTRAVENOUS PRN
Status: DISCONTINUED | OUTPATIENT
Start: 2024-12-16 | End: 2024-12-16 | Stop reason: HOSPADM

## 2024-12-16 RX ORDER — HYDROMORPHONE HCL IN WATER/PF 6 MG/30 ML
0.4 PATIENT CONTROLLED ANALGESIA SYRINGE INTRAVENOUS EVERY 5 MIN PRN
Status: DISCONTINUED | OUTPATIENT
Start: 2024-12-16 | End: 2024-12-16 | Stop reason: HOSPADM

## 2024-12-16 RX ORDER — NALOXONE HYDROCHLORIDE 0.4 MG/ML
0.1 INJECTION, SOLUTION INTRAMUSCULAR; INTRAVENOUS; SUBCUTANEOUS
Status: DISCONTINUED | OUTPATIENT
Start: 2024-12-16 | End: 2024-12-16 | Stop reason: HOSPADM

## 2024-12-16 RX ORDER — GABAPENTIN 300 MG/1
300 CAPSULE ORAL
Status: COMPLETED | OUTPATIENT
Start: 2024-12-16 | End: 2024-12-16

## 2024-12-16 RX ORDER — LIDOCAINE HYDROCHLORIDE 10 MG/ML
INJECTION, SOLUTION INFILTRATION; PERINEURAL PRN
Status: DISCONTINUED | OUTPATIENT
Start: 2024-12-16 | End: 2024-12-16

## 2024-12-16 RX ORDER — ACETAMINOPHEN 325 MG/1
650 TABLET ORAL EVERY 4 HOURS PRN
Status: DISCONTINUED | OUTPATIENT
Start: 2024-12-19 | End: 2024-12-20 | Stop reason: HOSPADM

## 2024-12-16 RX ORDER — ONDANSETRON 2 MG/ML
4 INJECTION INTRAMUSCULAR; INTRAVENOUS EVERY 6 HOURS PRN
Status: DISCONTINUED | OUTPATIENT
Start: 2024-12-16 | End: 2024-12-20 | Stop reason: HOSPADM

## 2024-12-16 RX ORDER — ONDANSETRON 2 MG/ML
INJECTION INTRAMUSCULAR; INTRAVENOUS PRN
Status: DISCONTINUED | OUTPATIENT
Start: 2024-12-16 | End: 2024-12-16

## 2024-12-16 RX ORDER — CEFAZOLIN SODIUM/WATER 3 G/30 ML
3 SYRINGE (ML) INTRAVENOUS SEE ADMIN INSTRUCTIONS
Status: DISCONTINUED | OUTPATIENT
Start: 2024-12-16 | End: 2024-12-16 | Stop reason: HOSPADM

## 2024-12-16 RX ORDER — OXYCODONE HYDROCHLORIDE 5 MG/1
10 TABLET ORAL EVERY 4 HOURS PRN
Status: DISCONTINUED | OUTPATIENT
Start: 2024-12-16 | End: 2024-12-17

## 2024-12-16 RX ORDER — POLYETHYLENE GLYCOL 3350 17 G/17G
17 POWDER, FOR SOLUTION ORAL DAILY
Status: DISCONTINUED | OUTPATIENT
Start: 2024-12-17 | End: 2024-12-20 | Stop reason: HOSPADM

## 2024-12-16 RX ORDER — BUPIVACAINE HYDROCHLORIDE AND EPINEPHRINE 2.5; 5 MG/ML; UG/ML
2 INJECTION, SOLUTION EPIDURAL; INFILTRATION; INTRACAUDAL; PERINEURAL ONCE
Status: DISCONTINUED | OUTPATIENT
Start: 2024-12-16 | End: 2024-12-16 | Stop reason: HOSPADM

## 2024-12-16 RX ORDER — PROPOFOL 10 MG/ML
INJECTION, EMULSION INTRAVENOUS CONTINUOUS PRN
Status: DISCONTINUED | OUTPATIENT
Start: 2024-12-16 | End: 2024-12-16

## 2024-12-16 RX ORDER — ALBUTEROL SULFATE 90 UG/1
2 INHALANT RESPIRATORY (INHALATION) EVERY 4 HOURS PRN
COMMUNITY

## 2024-12-16 RX ORDER — DEXAMETHASONE SODIUM PHOSPHATE 4 MG/ML
4 INJECTION, SOLUTION INTRA-ARTICULAR; INTRALESIONAL; INTRAMUSCULAR; INTRAVENOUS; SOFT TISSUE
Status: DISCONTINUED | OUTPATIENT
Start: 2024-12-16 | End: 2024-12-16 | Stop reason: HOSPADM

## 2024-12-16 RX ORDER — SODIUM CHLORIDE, SODIUM LACTATE, POTASSIUM CHLORIDE, CALCIUM CHLORIDE 600; 310; 30; 20 MG/100ML; MG/100ML; MG/100ML; MG/100ML
INJECTION, SOLUTION INTRAVENOUS CONTINUOUS PRN
Status: DISCONTINUED | OUTPATIENT
Start: 2024-12-16 | End: 2024-12-16

## 2024-12-16 RX ORDER — PROPOFOL 10 MG/ML
INJECTION, EMULSION INTRAVENOUS PRN
Status: DISCONTINUED | OUTPATIENT
Start: 2024-12-16 | End: 2024-12-16

## 2024-12-16 RX ORDER — MAGNESIUM HYDROXIDE 1200 MG/15ML
LIQUID ORAL PRN
Status: DISCONTINUED | OUTPATIENT
Start: 2024-12-16 | End: 2024-12-16 | Stop reason: HOSPADM

## 2024-12-16 RX ORDER — OXYCODONE HYDROCHLORIDE 5 MG/1
5 TABLET ORAL EVERY 4 HOURS PRN
Status: DISCONTINUED | OUTPATIENT
Start: 2024-12-16 | End: 2024-12-17

## 2024-12-16 RX ADMIN — SENNOSIDES AND DOCUSATE SODIUM 1 TABLET: 50; 8.6 TABLET ORAL at 21:30

## 2024-12-16 RX ADMIN — ACETAMINOPHEN 975 MG: 325 TABLET ORAL at 13:47

## 2024-12-16 RX ADMIN — MIDAZOLAM 2 MG: 1 INJECTION INTRAMUSCULAR; INTRAVENOUS at 07:23

## 2024-12-16 RX ADMIN — PROPOFOL 100 MCG/KG/MIN: 10 INJECTION, EMULSION INTRAVENOUS at 07:47

## 2024-12-16 RX ADMIN — LIDOCAINE HYDROCHLORIDE 5 ML: 10 INJECTION, SOLUTION INFILTRATION; PERINEURAL at 07:29

## 2024-12-16 RX ADMIN — KETAMINE HYDROCHLORIDE 50 MG: 10 INJECTION, SOLUTION INTRAMUSCULAR; INTRAVENOUS at 09:05

## 2024-12-16 RX ADMIN — FENTANYL CITRATE 25 MCG: 50 INJECTION INTRAMUSCULAR; INTRAVENOUS at 11:07

## 2024-12-16 RX ADMIN — HYDROMORPHONE HYDROCHLORIDE 0.5 MG: 1 INJECTION, SOLUTION INTRAMUSCULAR; INTRAVENOUS; SUBCUTANEOUS at 09:21

## 2024-12-16 RX ADMIN — SODIUM CHLORIDE, PRESERVATIVE FREE: 5 INJECTION INTRAVENOUS at 13:48

## 2024-12-16 RX ADMIN — FENTANYL CITRATE 25 MCG: 50 INJECTION INTRAMUSCULAR; INTRAVENOUS at 11:40

## 2024-12-16 RX ADMIN — ROCURONIUM BROMIDE 80 MG: 10 INJECTION, SOLUTION INTRAVENOUS at 07:29

## 2024-12-16 RX ADMIN — Medication 200 MG: at 08:33

## 2024-12-16 RX ADMIN — CEFAZOLIN SODIUM 2 G: 2 INJECTION, SOLUTION INTRAVENOUS at 23:40

## 2024-12-16 RX ADMIN — SODIUM CHLORIDE, POTASSIUM CHLORIDE, SODIUM LACTATE AND CALCIUM CHLORIDE: 600; 310; 30; 20 INJECTION, SOLUTION INTRAVENOUS at 07:23

## 2024-12-16 RX ADMIN — ALBUMIN (HUMAN): 12.5 SOLUTION INTRAVENOUS at 09:23

## 2024-12-16 RX ADMIN — ALBUMIN (HUMAN): 12.5 SOLUTION INTRAVENOUS at 07:59

## 2024-12-16 RX ADMIN — FENTANYL CITRATE 25 MCG: 50 INJECTION INTRAMUSCULAR; INTRAVENOUS at 11:36

## 2024-12-16 RX ADMIN — CEFAZOLIN SODIUM 2 G: 2 INJECTION, SOLUTION INTRAVENOUS at 16:41

## 2024-12-16 RX ADMIN — GABAPENTIN 300 MG: 300 CAPSULE ORAL at 06:44

## 2024-12-16 RX ADMIN — FENTANYL CITRATE 25 MCG: 50 INJECTION INTRAMUSCULAR; INTRAVENOUS at 11:25

## 2024-12-16 RX ADMIN — OXYCODONE 5 MG: 5 TABLET ORAL at 16:41

## 2024-12-16 RX ADMIN — ACETAMINOPHEN 975 MG: 325 TABLET ORAL at 21:30

## 2024-12-16 RX ADMIN — HYDROMORPHONE HYDROCHLORIDE 0.5 MG: 1 INJECTION, SOLUTION INTRAMUSCULAR; INTRAVENOUS; SUBCUTANEOUS at 10:20

## 2024-12-16 RX ADMIN — Medication 3 G: at 07:23

## 2024-12-16 RX ADMIN — PHENYLEPHRINE HYDROCHLORIDE 0.2 MCG/KG/MIN: 10 INJECTION INTRAVENOUS at 08:37

## 2024-12-16 RX ADMIN — HYDROMORPHONE HYDROCHLORIDE 0.4 MG: 0.2 INJECTION, SOLUTION INTRAMUSCULAR; INTRAVENOUS; SUBCUTANEOUS at 13:47

## 2024-12-16 RX ADMIN — DEXAMETHASONE SODIUM PHOSPHATE 10 MG: 10 INJECTION, SOLUTION INTRAMUSCULAR; INTRAVENOUS at 07:29

## 2024-12-16 RX ADMIN — OXYCODONE 5 MG: 5 TABLET ORAL at 12:13

## 2024-12-16 RX ADMIN — HYDROMORPHONE HYDROCHLORIDE 1 MG: 1 INJECTION, SOLUTION INTRAMUSCULAR; INTRAVENOUS; SUBCUTANEOUS at 08:13

## 2024-12-16 RX ADMIN — PROPOFOL 200 MG: 10 INJECTION, EMULSION INTRAVENOUS at 07:29

## 2024-12-16 RX ADMIN — FENTANYL CITRATE 100 MCG: 50 INJECTION INTRAMUSCULAR; INTRAVENOUS at 07:29

## 2024-12-16 RX ADMIN — ONDANSETRON 4 MG: 2 INJECTION INTRAMUSCULAR; INTRAVENOUS at 07:29

## 2024-12-16 ASSESSMENT — ACTIVITIES OF DAILY LIVING (ADL)
ADLS_ACUITY_SCORE: 28
ADLS_ACUITY_SCORE: 35
ADLS_ACUITY_SCORE: 35
ADLS_ACUITY_SCORE: 28
ADLS_ACUITY_SCORE: 35
ADLS_ACUITY_SCORE: 35
ADLS_ACUITY_SCORE: 28
ADLS_ACUITY_SCORE: 34
ADLS_ACUITY_SCORE: 21
ADLS_ACUITY_SCORE: 35
ADLS_ACUITY_SCORE: 28
ADLS_ACUITY_SCORE: 37
ADLS_ACUITY_SCORE: 34
ADLS_ACUITY_SCORE: 28
ADLS_ACUITY_SCORE: 31

## 2024-12-16 ASSESSMENT — COPD QUESTIONNAIRES: COPD: 1

## 2024-12-16 NOTE — ANESTHESIA CARE TRANSFER NOTE
Patient: Yash Ribera    Procedure: Procedure(s):  LEFT LUMBAR 3 - LUMBAR 4 TRANSFORAMINAL LUMBAR INTERBODY FUSION WITH STEALTH NAVIGATION       Diagnosis: Low back pain [M54.50]  Spondylolisthesis of lumbar region [M43.16]  Stenosis, spinal, lumbar [M48.061]  Diagnosis Additional Information: No value filed.    Anesthesia Type:   General     Note:    Oropharynx: oral airway in place  Level of Consciousness: drowsy  Oxygen Supplementation: face mask  Level of Supplemental Oxygen (L/min / FiO2): 10  Independent Airway: airway patency satisfactory and stable  Dentition: dentition unchanged  Vital Signs Stable: post-procedure vital signs reviewed and stable  Report to RN Given: handoff report given  Patient transferred to: PACU    Handoff Report: Identifed the Patient, Identified the Reponsible Provider, Reviewed the pertinent medical history, Discussed the surgical course, Reviewed Intra-OP anesthesia mangement and issues during anesthesia, Set expectations for post-procedure period and Allowed opportunity for questions and acknowledgement of understanding      Vitals:  Vitals Value Taken Time   /92 12/16/24 1046   Temp 36.7  C (98.1  F) 12/16/24 1045   Pulse 78 12/16/24 1048   Resp 14 12/16/24 1048   SpO2 92 % 12/16/24 1048   Vitals shown include unfiled device data.    Electronically Signed By: NOÉ Santillan CRNA  December 16, 2024  10:50 AM

## 2024-12-16 NOTE — ANESTHESIA PROCEDURE NOTES
Airway       Patient location during procedure: OR       Procedure Start/Stop Times: 12/16/2024 7:32 AM and 12/16/2024 7:32 AM  Staff -        Anesthesiologist:  Miles Osei MD       CRNA: Pao Wolff APRN CRNA       Performed By: CRNAIndications and Patient Condition       Indications for airway management: tito-procedural       Induction type:intravenous       Mask difficulty assessment: 1 - vent by mask    Final Airway Details       Final airway type: endotracheal airway       Successful airway: ETT - single  Endotracheal Airway Details        ETT size (mm): 8.0       Cuffed: yes       Successful intubation technique: video laryngoscopy       VL Blade Size: Glidescope 3       Grade View of Cords: 1       Position: Right       Measured from: lips       Secured at (cm): 22       Bite block used: Molar (bilateral molar soft bite blocks)    Post intubation assessment        Placement verified by: capnometry, equal breath sounds and chest rise        Number of attempts at approach: 1       Number of other approaches attempted: 0       Secured with: tape       Ease of procedure: easy       Dentition: Intact and Unchanged    Medication(s) Administered   Medication Administration Time: 12/16/2024 7:32 AM

## 2024-12-16 NOTE — CARE PLAN
12/16/24 0640   Fall Event   Patient Assessed By nurse   Name of Provider Notified Dr. Coyle & Dr. Osei   Family/Designated Caregiver Notified of Fall Yes  (Wife was with patient at time of fall and assisted patient.)   Fall Prevention Plan Updated Yes   Name of Family/Designated Caregiver Notified Tracy Ribera

## 2024-12-16 NOTE — PHARMACY-ADMISSION MEDICATION HISTORY
Pharmacist Admission Medication History    Admission medication history is complete. The information provided in this note is only as accurate as the sources available at the time of the update.    Information Source(s): Patient and CareEverywhere/SureScripts via in-person    Pertinent Information:   Patient brought albuterol and trelegy ellipta    Allergies reviewed with patient and updates made in EHR: yes    Medication History Completed By: Bebe Christy RPH 12/16/2024 7:06 AM    PTA Med List   Medication Sig Last Dose/Taking    acetaminophen (TYLENOL) 325 MG tablet Take 325-650 mg by mouth every 4 hours as needed for pain 12/13/2024    albuterol (PROAIR HFA/PROVENTIL HFA/VENTOLIN HFA) 108 (90 Base) MCG/ACT inhaler Inhale 2 puffs into the lungs every 4 hours as needed for shortness of breath, wheezing or cough. 12/14/2024    aspirin-acetaminophen-caffeine (EXCEDRIN MIGRAINE) 250-250-65 MG tablet Take 2 tablets by mouth daily as needed 12/12/2024    Fluticasone-Umeclidin-Vilanterol (TRELEGY ELLIPTA) 200-62.5-25 MCG/ACT oral inhaler Inhale 1 puff into the lungs daily. 12/16/2024 Morning    ibuprofen (ADVIL/MOTRIN) 200 MG tablet Take 200-400 mg by mouth every 6 hours as needed. 12/11/2024    ipratropium - albuterol 0.5 mg/2.5 mg/3 mL (DUONEB) 0.5-2.5 (3) MG/3ML neb solution Take 1 vial by nebulization every 6 hours as needed for shortness of breath, wheezing or cough. 12/15/2024    metoprolol succinate ER (TOPROL XL) 25 MG 24 hr tablet Take 1 tablet (25 mg) by mouth daily 12/16/2024 Morning

## 2024-12-16 NOTE — OP NOTE
Orthopedic  Operative Note    Pre-operative diagnosis: 1.  Lumbar radiculopathy and neurogenic claudication in the setting of adjacent segment degeneration L3-4 above previous L4-S1 noninstrumented fusion    Post-operative diagnosis: 1.  Lumbar radiculopathy and neurogenic claudication in the setting of adjacent segment degeneration L3-4 above previous L4-S1 noninstrumented fusion    Procedure: 1.  L3-4 TLIF through left-sided approach, [employer]tronic Catalyft   2.  L3-4 posterior instrumented fusion, Medtronic Modulex   3.  Local autograft, allograft, catalyst bone grafting   4.  Bilateral L3-4 decompression with total left-sided facetectomy   5.  O-arm with Stealth navigation   6.  EMG neuromonitoring   7.  Inspection of fusion mass L4-S1    Surgeon: Travis Coyle MD    Assistant(s): Arely Blackmon PA-C is an experienced first surgical assistant whose assistance was necessary for patient positioning, hemostasis, soft tissue and neural retraction, closure, and safe progression of surgery.      Anesthesia: General endotracheal anesthesia    Estimated blood loss: 200 mL     Drains: Hemovac    Specimens: None    Indications:                               The patient is a 64-year-old gentleman who developed lower extremity symptoms in the setting of adjacent segment degeneration above a previous fusion.  He failed conservative measures and elected for surgical intervention in form of an extension of fusion of the L3-4 segment.    I again reviewed the operative indications, the general and specific risks, benefits, and rehabilitation issues. Details of the procedure and postoperative recovery is highlighted. Patient and attending family appear to understand the issues and express their consent proceed.     Findings: Severe disc degeneration and stenosis    Complications: None     Procedure Detail: The patient was evaluated in the preoperative holding area.  They were given the opportunity ask questions regarding the  procedure in detail, and provided informed consent to proceed.  Their back was marked with the surgeon's initials at the anticipated level of the incision.  They were brought back to the operative suite on a gurney.  There, they were inducted under general anesthesia by our anesthesia colleagues and placed prone onto a four-point Wade frame.  The back was prepped and draped in the typical sterile fashion.  A preprocedural pause was performed to identify the correct patient, laterality, procedure to be performed, as well as administration of preoperative antibiotics.     I then reopened the patient's previous incision and dissected subcu layers using electrocautery.  I divided the fascia on either side of the spinous process using electrocautery and then used a Jackson to subperiosteally dissected tissues off the side of the spinous process and lamina.  Using a high-speed bur I made an indentation at the presumed L4 pedicle and obtained a crosstable x-ray confirming appropriate position of her surgical site.  I then completed my exposure to the tips of the transverse processes at L3 and L4 bilaterally, and placed an O-arm tower on the L4 spinous process.  I then obtained an O-arm spin.  I used the spin to confirm solid-appearing fusion L4-S1.    I set about placing screws from L3-L4 in the following fashion.  First, I obtained a pedicle trajectory using the navigated bur, and made a  hole using a navigated bur.  I then used an awl tip tap to undertapped the trajectory of the screw.  I then placed the screw.  I kept the screw heads out for better ease with the TLIF portion of the procedure.  All screws were then stimulated, and all screws stimulated above a 15.     Once the screws and screw trajectories were in place, I turned my attention to the TLIF.  I first removed the interspinous ligament at L3-4, and removed the inferior portion of the L3 spinous process using a rongeur.  The remnants of this were retained  for autograft.  I then used a high-speed bur to thin the lamina up to the insertion of the ligamentum flavum as well as a portion of the medial joint bilaterally overlying the lateral recess.  I used a bone scalpel to transect the pars interarticularis freeing the inferior articular process of L3.  This was then removed with a pituitary.  I used a bone scalpel to make a cut along the superior margin of the L4 pedicle, and the superior articular process was removed in a similar fashion.  I then resected the ligamentum flavum, which gave me excellent visualization of the thecal sac and traversing nerve root.  I retracted the thecal sac medially, and used a 15 blade to make an annulotomy.  I used a variety of TLIF daren and rasps to clear the remaining disc material from the disc space.  I cleared off the cartilaginous endplates, and irrigated the disc space removing any additional loose fragments I could find.  I then trialed and found that a long size 7 expandable implant was appropriate.  I then prepacked allograft and autograft into the disc space using the caulk gun.  I then placed the implants and obtained a crosstable lateral x-ray showing appropriate positioning of the implant.  I then expanded the implant until I met with firm resistance and a torque limiting feature engaged.  I obtained another crosstable lateral, which showed adequate expansion and lordosis of the segment.  I then removed the insertion stem, and removed any residual loose fragments of bone that may be compressing the thecal sac.  There was no residual compression on the thecal sac.    I then copiously irrigated the wound. I inserted the screw heads.  I used a high-speed bur to decorticate the right L3-4 facet joint, and packed this with remaining bone graft. I then placed rods from L3-L4 bilaterally.  I placed set plugs, compressed, and final tightened the set plugs.  I obtained final AP and lateral x-rays confirming appropriate  positioning of the implant and confirming that there was olga above and below the screws.  I then obtained additional hemostasis and found very little residual bleeding.  I placed 1 g of vancomycin powder into the wound.  A 10 Malagasy Hemovac drain was also placed.  The fascia was closed using 1 Vicryl suture, the subcuticular layer was closed using 2-0 Vicryl suture, 0.25% Marcaine was introduced, and the skin was closed using a 3-0 strata fix.  The skin was covered with skin glue and a Primapore dressing was placed.  A subcuticular 2-0 Vicryl suture and skin glue was used to close the percutaneous pin site after this was removed using a slap hammer.     All sponge and needle counts were correct at the end.  The patient tolerated the procedure without any complication.  EMG was quiet throughout the procedure.             Condition: Stable     Weight bearing status: Weight bearing as tolerated     Activity:      Anticoagulation plan:    Plan:      Travis Coyle MD  [facility]  Date:  12/16/2024  10:01 AM   Activity as tolerated  Patient may move about with assist as indicated or with supervision    Ambulation and mechanical prophylaxis.    The patient will be transferred to the floor once they clear PACU criteria.  Ancef will be given postoperatively for antibiotic prophylaxis, and the patient will mobilize for DVT prophylaxis.  The patient will be on strict no heavy lifting, twisting, or bending requirements for the next 3 months.  The patient will follow-up with me in 2 weeks for a follow-up visit.

## 2024-12-16 NOTE — ANESTHESIA POSTPROCEDURE EVALUATION
Patient: Yash Ribera    Procedure: Procedure(s):  LEFT LUMBAR 3 - LUMBAR 4 TRANSFORAMINAL LUMBAR INTERBODY FUSION WITH STEALTH NAVIGATION       Anesthesia Type:  General    Note:  Disposition: Admission   Postop Pain Control: Uneventful            Sign Out: Well controlled pain   PONV: No   Neuro/Psych: Uneventful            Sign Out: Acceptable/Baseline neuro status   Airway/Respiratory: Uneventful            Sign Out: Acceptable/Baseline resp. status   CV/Hemodynamics: Uneventful            Sign Out: Acceptable CV status; No obvious hypovolemia; No obvious fluid overload   Other NRE: NONE   DID A NON-ROUTINE EVENT OCCUR? No           Last vitals:  Vitals Value Taken Time   /67 12/16/24 1300   Temp 37  C (98.6  F) 12/16/24 1155   Pulse 74 12/16/24 1321   Resp 16 12/16/24 1200   SpO2 97 % 12/16/24 1321   Vitals shown include unfiled device data.    Electronically Signed By: Miles Osei MD  December 16, 2024  1:57 PM

## 2024-12-16 NOTE — PROGRESS NOTES
Patient & pt's wife report that the patient fell in the hospital parking lot on arrival.  Pt was getting out of the passenger side of his vehicle in the parking lot.  Stepped up and out of his vehicle, immediately lost his footing on the icy ground and slid to the ground.  Pt reports that he did NOT hit his head or lose consciousness.  Pt slid onto his back.  Pt's wife reports that a hospital staff member witnessed the fall and got a wheelchair.  Pt stood up independently and got into the wheelchair.  Was then brought into the hospital entrance via wheelchair.  On arrival, pt stood up and out of wheelchair, got himself dressed into a hospital gown and into the cart.  No open skin, bruises and lesions noted on assessment.  Daisy (charge RN), Dr. Coyle and Dr. Osei were both made aware of the fall.  Pt proceeded to surgery with no further concerns about fall.

## 2024-12-16 NOTE — INTERVAL H&P NOTE
"I have reviewed the surgical (or preoperative) H&P that is linked to this encounter, and examined the patient. There are no significant changes    Clinical Conditions Present on Arrival:  Clinically Significant Risk Factors Present on Admission                       # Obesity: Estimated body mass index is 37.6 kg/m  as calculated from the following:    Height as of this encounter: 1.854 m (6' 1\").    Weight as of this encounter: 129.3 kg (285 lb).       "

## 2024-12-16 NOTE — CONSULTS
"[facility]  Consult Note - Hospitalist Service  Date of Admission:  12/16/2024  Consult Requested by: Orthopedic surgery Dr. Coyle   Reason for Consult: Management of chronic medical conditions    Assessment & Plan   Yash Ribera is a 64 year old male admitted on 12/16/2024.  He has a past medical history significant for COPD, cardiac arrhythmia who presents to the hospital for elective L3-L4 lumbar fusion.  Hospitalist medicine consulted for management of chronic medical additions.    COPD  No signs of COPD exacerbation    Plan  Continue home inhalers (patient would like to utilize his home inhalers--communicated with patient's RN)  Wean off supplemental oxygen target SpO2 88 to 92%.    Cardiac arrhythmia  Frequent PVC on for examination  Patient already received his home metoprolol today    Plan  Continue home metoprolol on 12/17    Status post L3-L4 fusion  Pain is well-controlled    Plan  DVT prophylaxis and pain management as per primary team         Clinically Significant Risk Factors Present on Admission                             # Obesity: Estimated body mass index is 37.6 kg/m  as calculated from the following:    Height as of this encounter: 1.854 m (6' 1\").    Weight as of this encounter: 129.3 kg (285 lb).              EFRAÍN ENGLAND MD  Hospitalist Service  Securely message with [employer] (more info)  Text page via [employer] Paging/Directory   ______________________________________________________________________      History of Present Illness   Yash Ribera is a 64 year old male admitted on 12/16/2024.  He has a past medical history significant for COPD, cardiac arrhythmia who presents to the hospital for elective L3-L4 lumbar fusion.     Evaluated around 4 PM.  Patient is doing well.  He denies any chest pain or shortness of breath.  He denies any lightheadedness.  He has some lower back pain.          Past Medical History    Past Medical History:   Diagnosis Date    " Arrhythmia     Back pain     Emphysema lung (H)     COPD    Gastroesophageal reflux disease with esophagitis     Irregular heart beat     Paralysis of diaphragm     left side partial       Past Surgical History   Past Surgical History:   Procedure Laterality Date    BACK SURGERY      NECK SURGERY      ORTHOPEDIC SURGERY         Medications   I have reviewed this patient's current medications  Current Facility-Administered Medications   Medication Dose Route Frequency Provider Last Rate Last Admin    [START ON 12/19/2024] acetaminophen (TYLENOL) tablet 650 mg  650 mg Oral Q4H PRN Travis Coyle MD        acetaminophen (TYLENOL) tablet 975 mg  975 mg Oral Q8H Travis Coyle MD   975 mg at 12/16/24 1347    albuterol (PROVENTIL HFA/VENTOLIN HFA) inhaler  2 puff Inhalation Q4H PRN Davis Ramos MD        benzocaine-menthol (CEPACOL) 15-3.6 MG lozenge 1 lozenge  1 lozenge Buccal Q1H PRN Travis Coyle MD        [START ON 12/19/2024] bisacodyl (DULCOLAX) suppository 10 mg  10 mg Rectal Daily PRN Travis Coyle MD        ceFAZolin (ANCEF) 2 g in 100 mL D5W intermittent infusion  2 g Intravenous Q8H Travis Coyle MD        [START ON 12/17/2024] Fluticasone-Umeclidin-Vilanterol (TRELEGY ELLIPTA) 200-62.5-25 MCG/ACT oral inhaler 1 puff  1 puff Inhalation Daily Davis Ramos MD        heparin (porcine) 30,000 Units in sodium chloride 0.9% 1000 mL PERFUSION solution (cell saver)   PERFUSION Once Travis Coyle MD        HYDROmorphone (DILAUDID) injection 0.2 mg  0.2 mg Intravenous Q2H PRN Travis Coyle MD        Or    HYDROmorphone (DILAUDID) injection 0.4 mg  0.4 mg Intravenous Q2H PRN Travis Coyle MD   0.4 mg at 12/16/24 1347    ipratropium - albuterol 0.5 mg/2.5 mg/3 mL (DUONEB) neb solution 3 mL  1 vial Nebulization Q6H PRN Davis Ramos MD        loratadine (CLARITIN) tablet 10 mg  10 mg Oral Daily PRN Travis Coyle,  MD        [START ON 12/18/2024] magnesium hydroxide (MILK OF MAGNESIA) suspension 30 mL  30 mL Oral Daily PRN Travis Coyle MD        [START ON 12/17/2024] metoprolol succinate ER (TOPROL XL) 24 hr tablet 25 mg  25 mg Oral Daily Davis Ramos MD        [START ON 12/17/2024] multivitamin, therapeutic (THERA-VIT) tablet 1 tablet  1 tablet Oral Daily Travis Coyle MD        naloxone (NARCAN) injection 0.2 mg  0.2 mg Intravenous Q2 Min PRN Travis Coyle MD        Or    naloxone (NARCAN) injection 0.4 mg  0.4 mg Intravenous Q2 Min PRN Travis Coyle MD        Or    naloxone (NARCAN) injection 0.2 mg  0.2 mg Intramuscular Q2 Min PRN Travis Coyle MD        Or    naloxone (NARCAN) injection 0.4 mg  0.4 mg Intramuscular Q2 Min PRN Travis Coyle MD        ondansetron (ZOFRAN ODT) ODT tab 4 mg  4 mg Oral Q6H PRN Travis Coyle MD        Or    ondansetron (ZOFRAN) injection 4 mg  4 mg Intravenous Q6H PRN Travis Coyle MD        oxyCODONE (ROXICODONE) tablet 5 mg  5 mg Oral Q4H PRN Travis Coyle MD   5 mg at 12/16/24 1213    Or    oxyCODONE (ROXICODONE) tablet 10 mg  10 mg Oral Q4H PRN Travis Coyle MD        [START ON 12/17/2024] polyethylene glycol (MIRALAX) Packet 17 g  17 g Oral Daily Travis Coyle MD        prochlorperazine (COMPAZINE) injection 10 mg  10 mg Intravenous Q6H PRN Travis Coyle MD        Or    prochlorperazine (COMPAZINE) tablet 10 mg  10 mg Oral Q6H PRN Travis Coyle MD        senna-docusate (SENOKOT-S/PERICOLACE) 8.6-50 MG per tablet 1 tablet  1 tablet Oral BID Travis Coyle MD        sodium chloride 0.9 % infusion   Intravenous Continuous Travis Coyle  mL/hr at 12/16/24 1348 New Bag at 12/16/24 1348            Physical Exam   Vital Signs: Temp: 98.3  F (36.8  C) Temp src: Oral BP: 120/59 Pulse: 82   Resp: 16 SpO2: 92 % O2  Device: Nasal cannula Oxygen Delivery: 4 LPM  Weight: 285 lbs 0 oz    Physical Exam  Constitutional:       General: He is not in acute distress.     Appearance: He is not toxic-appearing.   Cardiovascular:      Rate and Rhythm: Normal rate.   Pulmonary:      Effort: Pulmonary effort is normal. No respiratory distress.      Breath sounds: Normal breath sounds. No wheezing.   Neurological:      Mental Status: He is alert.          Medical Decision Making       40 MINUTES SPENT BY ME on the date of service doing chart review, history, exam, documentation & further activities per the note.      Data         Imaging results reviewed over the past 24 hrs:   Recent Results (from the past 24 hours)   XR Surgery OARM    Narrative    This exam was marked as non-reportable because it will not be read by a   radiologist or a [place] non-radiologist provider.         XR Surgery THANH  Fluoro G/T 5 Min    Narrative    This exam was marked as non-reportable because it will not be read by a   radiologist or a [place] non-radiologist provider.

## 2024-12-16 NOTE — ANESTHESIA PREPROCEDURE EVALUATION
"Anesthesia Pre-Procedure Evaluation    Patient: Yash Ribera   MRN: [id number] : 1960        Procedure : Procedure(s):  LEFT LUMBAR 3 - LUMBAR 4 TRANSFORAMINAL LUMBAR INTERBODY FUSION WITH STEALTH NAVIGATION          Past Medical History:   Diagnosis Date    Arrhythmia     Back pain     Emphysema lung (H)     COPD    Gastroesophageal reflux disease with esophagitis     Irregular heart beat     Paralysis of diaphragm     left side partial      Past Surgical History:   Procedure Laterality Date    BACK SURGERY      NECK SURGERY      ORTHOPEDIC SURGERY        Allergies   Allergen Reactions    Wellbutrin [Bupropion] Hives    Bee Venom      Other Reaction(s): Edema,generalized    Bees     Pollen Extract     Shrimp     Shrimp (Diagnostic) Unknown      Social History     Tobacco Use    Smoking status: Former     Types: Cigarettes    Smokeless tobacco: Never   Substance Use Topics    Alcohol use: Yes     Comment: 3 times/week      Wt Readings from Last 1 Encounters:   24 134.7 kg (297 lb)        Anesthesia Evaluation   Pt has had prior anesthetic.         ROS/MED HX  ENT/Pulmonary:     (+)                          COPD,              Neurologic:  - neg neurologic ROS     Cardiovascular:     (+)  - -   -  - -                          Irregular Heartbeat/Palpitations,            METS/Exercise Tolerance:     Hematologic:       Musculoskeletal:       GI/Hepatic:  - neg GI/hepatic ROS     Renal/Genitourinary:  - neg Renal ROS     Endo:  - neg endo ROS     Psychiatric/Substance Use:  - neg psychiatric ROS     Infectious Disease:  - neg infectious disease ROS     Malignancy:  - neg malignancy ROS     Other:  - neg other ROS          Physical Exam    Airway  airway exam normal           Respiratory Devices and Support         Dental           Cardiovascular   cardiovascular exam normal          Pulmonary   pulmonary exam normal                OUTSIDE LABS:  CBC: No results found for: \"WBC\", \"HGB\", \"HCT\", \"PLT\"  BMP: " "  Lab Results   Component Value Date    CR 0.9 04/19/2024     COAGS: No results found for: \"PTT\", \"INR\", \"FIBR\"  POC: No results found for: \"BGM\", \"HCG\", \"HCGS\"  HEPATIC: No results found for: \"ALBUMIN\", \"PROTTOTAL\", \"ALT\", \"AST\", \"GGT\", \"ALKPHOS\", \"BILITOTAL\", \"BILIDIRECT\", \"IAN\"  OTHER: No results found for: \"PH\", \"LACT\", \"A1C\", \"DAIN\", \"PHOS\", \"MAG\", \"LIPASE\", \"AMYLASE\", \"TSH\", \"T4\", \"T3\", \"CRP\", \"SED\"    Anesthesia Plan    ASA Status:  3       Anesthesia Type: General.         Techniques and Equipment:     - Airway: Video-Laryngoscope       Consents    Anesthesia Plan(s) and associated risks, benefits, and realistic alternatives discussed. Questions answered and patient/representative(s) expressed understanding.     - Discussed:     - Discussed with:  Patient            Postoperative Care    Pain management: Multi-modal analgesia.   PONV prophylaxis: Ondansetron (or other 5HT-3), Dexamethasone or Solumedrol     Comments:               Miles Osei MD    I have reviewed the pertinent notes and labs in the chart from the past 30 days and (re)examined the patient.  Any updates or changes from those notes are reflected in this note.                                   "

## 2024-12-17 ENCOUNTER — APPOINTMENT (OUTPATIENT)
Dept: PHYSICAL THERAPY | Facility: CLINIC | Age: 64
DRG: 402 | End: 2024-12-17
Attending: ORTHOPAEDIC SURGERY
Payer: MEDICARE

## 2024-12-17 ENCOUNTER — APPOINTMENT (OUTPATIENT)
Dept: OCCUPATIONAL THERAPY | Facility: CLINIC | Age: 64
DRG: 402 | End: 2024-12-17
Attending: ORTHOPAEDIC SURGERY
Payer: MEDICARE

## 2024-12-17 LAB
ANION GAP SERPL CALCULATED.3IONS-SCNC: 11 MMOL/L (ref 7–15)
BUN SERPL-MCNC: 11.8 MG/DL (ref 8–23)
CALCIUM SERPL-MCNC: 8.5 MG/DL (ref 8.8–10.4)
CHLORIDE SERPL-SCNC: 103 MMOL/L (ref 98–107)
CREAT SERPL-MCNC: 0.81 MG/DL (ref 0.67–1.17)
EGFRCR SERPLBLD CKD-EPI 2021: >90 ML/MIN/1.73M2
ERYTHROCYTE [DISTWIDTH] IN BLOOD BY AUTOMATED COUNT: 11.9 % (ref 10–15)
GLUCOSE SERPL-MCNC: 141 MG/DL (ref 70–99)
HCO3 SERPL-SCNC: 23 MMOL/L (ref 22–29)
HCT VFR BLD AUTO: 35.6 % (ref 40–53)
HGB BLD-MCNC: 12.4 G/DL (ref 13.3–17.7)
MCH RBC QN AUTO: 31.2 PG (ref 26.5–33)
MCHC RBC AUTO-ENTMCNC: 34.8 G/DL (ref 31.5–36.5)
MCV RBC AUTO: 89 FL (ref 78–100)
PLATELET # BLD AUTO: 247 10E3/UL (ref 150–450)
POTASSIUM SERPL-SCNC: 4 MMOL/L (ref 3.4–5.3)
RBC # BLD AUTO: 3.98 10E6/UL (ref 4.4–5.9)
SODIUM SERPL-SCNC: 137 MMOL/L (ref 135–145)
WBC # BLD AUTO: 10.7 10E3/UL (ref 4–11)

## 2024-12-17 PROCEDURE — 36415 COLL VENOUS BLD VENIPUNCTURE: CPT | Performed by: ORTHOPAEDIC SURGERY

## 2024-12-17 PROCEDURE — 97116 GAIT TRAINING THERAPY: CPT | Mod: GP

## 2024-12-17 PROCEDURE — 94640 AIRWAY INHALATION TREATMENT: CPT | Mod: 76

## 2024-12-17 PROCEDURE — 94640 AIRWAY INHALATION TREATMENT: CPT

## 2024-12-17 PROCEDURE — 80048 BASIC METABOLIC PNL TOTAL CA: CPT | Performed by: ORTHOPAEDIC SURGERY

## 2024-12-17 PROCEDURE — 250N000013 HC RX MED GY IP 250 OP 250 PS 637: Performed by: NURSE PRACTITIONER

## 2024-12-17 PROCEDURE — 99232 SBSQ HOSP IP/OBS MODERATE 35: CPT | Performed by: STUDENT IN AN ORGANIZED HEALTH CARE EDUCATION/TRAINING PROGRAM

## 2024-12-17 PROCEDURE — 258N000003 HC RX IP 258 OP 636: Performed by: ORTHOPAEDIC SURGERY

## 2024-12-17 PROCEDURE — 250N000011 HC RX IP 250 OP 636: Performed by: ORTHOPAEDIC SURGERY

## 2024-12-17 PROCEDURE — 85027 COMPLETE CBC AUTOMATED: CPT | Performed by: ORTHOPAEDIC SURGERY

## 2024-12-17 PROCEDURE — 97110 THERAPEUTIC EXERCISES: CPT | Mod: GP

## 2024-12-17 PROCEDURE — 250N000013 HC RX MED GY IP 250 OP 250 PS 637: Performed by: STUDENT IN AN ORGANIZED HEALTH CARE EDUCATION/TRAINING PROGRAM

## 2024-12-17 PROCEDURE — 999N000157 HC STATISTIC RCP TIME EA 10 MIN

## 2024-12-17 PROCEDURE — 250N000009 HC RX 250: Performed by: STUDENT IN AN ORGANIZED HEALTH CARE EDUCATION/TRAINING PROGRAM

## 2024-12-17 PROCEDURE — 97166 OT EVAL MOD COMPLEX 45 MIN: CPT | Mod: GO

## 2024-12-17 PROCEDURE — 97161 PT EVAL LOW COMPLEX 20 MIN: CPT | Mod: GP

## 2024-12-17 PROCEDURE — 97535 SELF CARE MNGMENT TRAINING: CPT | Mod: GO

## 2024-12-17 PROCEDURE — 250N000013 HC RX MED GY IP 250 OP 250 PS 637: Performed by: ORTHOPAEDIC SURGERY

## 2024-12-17 PROCEDURE — 120N000001 HC R&B MED SURG/OB

## 2024-12-17 RX ORDER — HYDROMORPHONE HYDROCHLORIDE 2 MG/1
2-4 TABLET ORAL EVERY 4 HOURS PRN
Status: DISCONTINUED | OUTPATIENT
Start: 2024-12-17 | End: 2024-12-20 | Stop reason: HOSPADM

## 2024-12-17 RX ORDER — OLOPATADINE HYDROCHLORIDE 1 MG/ML
1 SOLUTION/ DROPS OPHTHALMIC 2 TIMES DAILY
Status: DISCONTINUED | OUTPATIENT
Start: 2024-12-17 | End: 2024-12-20 | Stop reason: HOSPADM

## 2024-12-17 RX ORDER — METHOCARBAMOL 500 MG/1
500 TABLET, FILM COATED ORAL 3 TIMES DAILY PRN
Status: DISCONTINUED | OUTPATIENT
Start: 2024-12-17 | End: 2024-12-19

## 2024-12-17 RX ORDER — TETRAHYDROZOLINE HCL 0.05 %
1 DROPS OPHTHALMIC (EYE) 4 TIMES DAILY PRN
Status: DISCONTINUED | OUTPATIENT
Start: 2024-12-17 | End: 2024-12-20 | Stop reason: HOSPADM

## 2024-12-17 RX ADMIN — SODIUM CHLORIDE, PRESERVATIVE FREE: 5 INJECTION INTRAVENOUS at 07:43

## 2024-12-17 RX ADMIN — METOPROLOL SUCCINATE 25 MG: 25 TABLET, EXTENDED RELEASE ORAL at 07:47

## 2024-12-17 RX ADMIN — BENZOCAINE AND MENTHOL 1 LOZENGE: 15; 3.6 LOZENGE ORAL at 18:22

## 2024-12-17 RX ADMIN — METHOCARBAMOL 500 MG: 500 TABLET ORAL at 16:57

## 2024-12-17 RX ADMIN — IPRATROPIUM BROMIDE AND ALBUTEROL SULFATE 3 ML: .5; 3 SOLUTION RESPIRATORY (INHALATION) at 19:30

## 2024-12-17 RX ADMIN — IPRATROPIUM BROMIDE AND ALBUTEROL SULFATE 3 ML: .5; 3 SOLUTION RESPIRATORY (INHALATION) at 10:21

## 2024-12-17 RX ADMIN — SENNOSIDES AND DOCUSATE SODIUM 1 TABLET: 50; 8.6 TABLET ORAL at 07:47

## 2024-12-17 RX ADMIN — ACETAMINOPHEN 975 MG: 325 TABLET ORAL at 21:02

## 2024-12-17 RX ADMIN — OXYCODONE 10 MG: 5 TABLET ORAL at 04:04

## 2024-12-17 RX ADMIN — ACETAMINOPHEN 975 MG: 325 TABLET ORAL at 05:12

## 2024-12-17 RX ADMIN — HYDROMORPHONE HYDROCHLORIDE 4 MG: 2 TABLET ORAL at 23:20

## 2024-12-17 RX ADMIN — HYDROMORPHONE HYDROCHLORIDE 0.4 MG: 0.2 INJECTION, SOLUTION INTRAMUSCULAR; INTRAVENOUS; SUBCUTANEOUS at 15:29

## 2024-12-17 RX ADMIN — SENNOSIDES AND DOCUSATE SODIUM 1 TABLET: 50; 8.6 TABLET ORAL at 20:57

## 2024-12-17 RX ADMIN — HYDROMORPHONE HYDROCHLORIDE 2 MG: 2 TABLET ORAL at 18:24

## 2024-12-17 RX ADMIN — ACETAMINOPHEN 975 MG: 325 TABLET ORAL at 13:31

## 2024-12-17 RX ADMIN — POLYETHYLENE GLYCOL 3350 17 G: 17 POWDER, FOR SOLUTION ORAL at 07:48

## 2024-12-17 RX ADMIN — OLOPATADINE HYDROCHLORIDE 1 DROP: 1 SOLUTION OPHTHALMIC at 20:58

## 2024-12-17 RX ADMIN — OXYCODONE 10 MG: 5 TABLET ORAL at 08:10

## 2024-12-17 RX ADMIN — THERA TABS 1 TABLET: TAB at 07:48

## 2024-12-17 ASSESSMENT — ACTIVITIES OF DAILY LIVING (ADL)
ADLS_ACUITY_SCORE: 40
ADLS_ACUITY_SCORE: 39
ADLS_ACUITY_SCORE: 40
ADLS_ACUITY_SCORE: 39
ADLS_ACUITY_SCORE: 40
ADLS_ACUITY_SCORE: 39
ADLS_ACUITY_SCORE: 40
ADLS_ACUITY_SCORE: 40
ADLS_ACUITY_SCORE: 39
ADLS_ACUITY_SCORE: 40
ADLS_ACUITY_SCORE: 34
ADLS_ACUITY_SCORE: 40
ADLS_ACUITY_SCORE: 41
ADLS_ACUITY_SCORE: 34
ADLS_ACUITY_SCORE: 39
ADLS_ACUITY_SCORE: 39
ADLS_ACUITY_SCORE: 40
ADLS_ACUITY_SCORE: 39
ADLS_ACUITY_SCORE: 39
ADLS_ACUITY_SCORE: 40
ADLS_ACUITY_SCORE: 34

## 2024-12-17 NOTE — PLAN OF CARE
Patient vital signs are at baseline: No, required supplemental oxygen to maintain 94% SpO2.   Patient able to ambulate as they were prior to admission or with assist devices provided by therapies during their stay:  Yes, ambulated well in room to restroom twice on shift.   Patient MUST void prior to discharge:  Yes passed three PVRs on shift  Patient able to tolerate oral intake:  Yes  Pain has adequate pain control using Oral analgesics:  Yes, after second ambulation to bathroom required oxy 5mg that controlled pain.   Does patient have an identified :  Yes  Has goal D/C date and time been discussed with patient:  Yes     Problem: Adult Inpatient Plan of Care  Goal: Absence of Hospital-Acquired Illness or Injury  Intervention: Prevent Skin Injury  Recent Flowsheet Documentation  Taken 12/16/2024 1900 by Lee Cavazos RN  Body Position: position changed independently     Problem: Adult Inpatient Plan of Care  Goal: Absence of Hospital-Acquired Illness or Injury  Intervention: Prevent and Manage VTE (Venous Thromboembolism) Risk  Recent Flowsheet Documentation  Taken 12/16/2024 2300 by Lee Cavazos RN  VTE Prevention/Management: SCDs on (sequential compression devices)  Taken 12/16/2024 1900 by Lee Cavazos RN  VTE Prevention/Management: SCDs on (sequential compression devices)     Problem: Adult Inpatient Plan of Care  Goal: Optimal Comfort and Wellbeing  Intervention: Monitor Pain and Promote Comfort  Recent Flowsheet Documentation  Taken 12/16/2024 2130 by Lee Cavazos RN  Pain Management Interventions: medication (see MAR)  Taken 12/16/2024 1900 by Lee Cavazos RN  Pain Management Interventions:   repositioned   relaxation techniques promoted    Patient is concerned about his home meds being given. His inhaler is in his med box, home albuterol sent to pharmacy with patient label. Has yet to return. Scheduled inhaler meds for 9 am.

## 2024-12-17 NOTE — PROGRESS NOTES
12/17/24 1030   Appointment Info   Signing Clinician's Name / Credentials (OT) Keron Monk, OTR/L   Living Environment   People in Home spouse   Current Living Arrangements house;other (see comments)  (Can stay on one level at home.)   Living Environment Comments Tub/shower with GB. RTS with Vanity on the R   Self-Care   Usual Activity Tolerance good   Current Activity Tolerance moderate   Fall history within last six months yes  (Per chart review.)   Number of times patient has fallen within last six months 2   Instrumental Activities of Daily Living (IADL)   IADL Comments Help with dressing, cooking, cleaning and laundry are shared with wife.   General Information   Onset of Illness/Injury or Date of Surgery 12/16/24   Referring Physician Dr. Travis Coyle   Patient/Family Therapy Goal Statement (OT) To return home   Additional Occupational Profile Info/Pertinent History of Current Problem Adm for spine surgery - Lumbar Fusion at L3-L4.   PMH:  COPD, cardiac arrhythmia who presents to the hospital for elective L3-L4 lumbar fusion.   Existing Precautions/Restrictions (S)  spinal   Cognitive Status Examination   Follows Commands WNL   Visual Perception   Visual Impairment/Limitations corrective lenses full-time   Transfers   Transfers bed-chair transfer;sit-stand transfer;toilet transfer   Transfer Skill: Bed to Chair/Chair to Bed   Bed-Chair [place] (Transfers) verbal cues;contact guard   Assistive Device (Bed-Chair Transfers) rolling walker   Sit-Stand Transfer   Sit-Stand [place] (Transfers) verbal cues;contact guard   Assistive Device (Sit-Stand Transfers) walker, front-wheeled   Toilet Transfer   Type (Toilet Transfer) sit-stand;stand-sit   [place] Level (Toilet Transfer) verbal cues;contact guard   Assistive Device (Toilet Transfer) raised toilet seat;grab bars/safety frame;walker, front-wheeled   Balance   Balance Assessment standing dynamic balance   Standing Balance: Dynamic  verbal cues;contact guard   Position/Device Used, Standing Balance walker, front-wheeled   Activities of Daily Living   BADL Assessment/Intervention upper body dressing;lower body dressing;grooming   Upper Body Dressing Assessment/Training   Position (Upper Body Dressing) supported sitting   [place] Level (Upper Body Dressing) upper body dressing skills;don;pull-over garment;supervision;verbal cues   Lower Body Dressing Assessment/Training   Position (Lower Body Dressing) supported sitting;supported standing   [place] Level (Lower Body Dressing) lower body dressing skills;doff;don;pants/bottoms;shoes/slippers;socks;verbal cues;maximum assist (25% patient effort)   Grooming Assessment/Training   Position (Grooming) supported standing   [place] Level (Grooming) grooming skills;verbal cues;wash face, hands;contact guard assist   Clinical Impression   Criteria for Skilled Therapeutic Interventions Met (OT) Yes, treatment indicated   OT Diagnosis Decreased indep with ADLs and trsfs due to spine surgery.   OT Problem List-Impairments impacting ADL problems related to;mobility   Assessment of Occupational Performance 3-5 Performance Deficits   Identified Performance Deficits dressing, toileting, G/H, trsfs, bathing   Planned Therapy Interventions (OT) ADL retraining;transfer training   Clinical Decision Making Complexity (OT) detailed assessment/moderate complexity   Risk & Benefits of therapy have been explained evaluation/treatment results reviewed;participants included;patient   OT Total Evaluation Time   OT Eval, Moderate Complexity Minutes (09896) 15   OT Goals   Therapy Frequency (OT) Daily   OT Predicted Duration/Target Date for Goal Attainment 12/19/24   OT Goals Lower Body Dressing;Bed Mobility;Toilet Transfer/Toileting;Hygiene/Grooming   OT: Hygiene/Grooming modified independent   OT: Lower Body Dressing Modified independent   OT: Bed Mobility Modified independent   OT: Toilet Transfer/Toileting  Modified independent   Interventions   Interventions Quick Adds Self-Care/Home Management   Self-Care/Home Management   Self-Care/Home Mgmt/ADL, Compensatory, Meal Prep Minutes (20805) 30   Treatment Detail/Skilled Intervention Pt sitting up in the chair when therapist arrived.  Reviewed with Pt their spine precautions and provided Pt with a written handout to take home.  Worked on FB dressing.  Pt unable to attain a figure four with his legs. Used a reacher to don and doff his L/B clothing.  Pt was CGA for this activity.  Worked on room trsfs using a FWW.  Completed trsfs to chair, toilet, BR sink using a FWW.  VC needed initially for posture and safe hand placement.  Reminders to breath properly. Educated Pt on using a cup for rinsing and spitting for teeth brushing to avoid forward flex of their trunk during oral cares.  Pt demo their understanding.  At end of session, Pt was sitting in the chair with alarm on and call light within reach. RN aware.   OT Discharge Planning   OT Plan Bed trsf and bed mobility using the log rolling method.   OT Discharge Recommendation (DC Rec) home with assist   OT Rationale for DC Rec Pt will have assist from family at home.   OT Brief overview of current status CGA with ADLs and trsfs using FWW.   Total Session Time   Timed Code Treatment Minutes 30   Total Session Time (sum of timed and untimed services) 45    M [facility]  OUTPATIENT OCCUPATIONAL THERAPY  EVALUATION  PLAN OF TREATMENT FOR OUTPATIENT REHABILITATION  (COMPLETE FOR INITIAL CLAIMS ONLY)  Patient's Last Name, First Name, M.I.  YOB: 1960  Yash Ribera                          Provider's Name  [facility] Medical Record No.  [id number]                             Onset Date:  12/16/24   Start of Care Date:      Type:     ___PT   _X_OT   ___SLP Medical Diagnosis:                       OT Diagnosis:  Decreased indep with ADLs and trsfs due to  spine surgery. Visits from SOC:  1     See note for plan of treatment, functional goals and certification details    I CERTIFY THE NEED FOR THESE SERVICES FURNISHED UNDER        THIS PLAN OF TREATMENT AND WHILE UNDER MY CARE     (Physician co-signature of this document indicates review and certification of the therapy plan).

## 2024-12-17 NOTE — PROGRESS NOTES
12/17/24 0913   Appointment Info   Signing Clinician's Name / Credentials (PT) Frandy Eng, PT   Quick Adds   Quick Adds Certification   Living Environment   People in Home spouse   Current Living Arrangements house   Home Accessibility stairs to enter home;stairs within home   Number of Stairs, Main Entrance 3   Stair Railings, Main Entrance railings safe and in good condition   Number of Stairs, Within Home, Primary eight   Stair Railings, Within Home, Primary railings safe and in good condition   Transportation Anticipated family or friend will provide   Self-Care   Usual Activity Tolerance fair   Current Activity Tolerance fair   Equipment Currently Used at Home cane, straight;walker, rolling   Fall history within last six months yes   Number of times patient has fallen within last six months 2   Activity/Exercise/Self-Care Comment Indep with all I ADL's and ADL's   General Information   Onset of Illness/Injury or Date of Surgery 12/16/24   Pertinent History of Current Problem (include personal factors and/or comorbidities that impact the POC) 64 year old male admitted on 12/16/2024.  He has a past medical history significant for COPD, cardiac arrhythmia who presents to the hospital for elective L3-L4 lumbar fusion.   Existing Precautions/Restrictions spinal   Cognition   Affect/Mental Status (Cognition) WNL   Range of Motion (ROM)   Range of Motion ROM is WFL   Strength (Manual Muscle Testing)   Strength Comments R LE weakness noted,  per patient R foot drop and  R LE weakness for past 30 years   Transfers   Transfers sit-stand transfer   Sit-Stand Transfer   Sit-Stand [place] (Transfers) contact guard;verbal cues   Assistive Device (Sit-Stand Transfers) walker, front-wheeled   Gait/Stairs (Locomotion)   [place] Level (Gait) contact guard;verbal cues   Assistive Device (Gait) walker, front-wheeled   Distance in Feet (Gait) 20   Pattern (Gait) step-to   Deviations/Abnormal Patterns (Gait) jose  decreased;gait speed decreased;stride length decreased   Balance   Balance no deficits were identified   Clinical Impression   Criteria for Skilled Therapeutic Intervention Yes, treatment indicated   PT Diagnosis (PT) Impaired functional mobility   Influenced by the following impairments weakness, pain   Functional limitations due to impairments transfers, gait   Clinical Presentation (PT Evaluation Complexity) stable   Clinical Presentation Rationale Patient presents as medically diagnosed   Clinical Decision Making (Complexity) low complexity   Planned Therapy Interventions (PT) home exercise program;gait training;stair training;transfer training   Risk & Benefits of therapy have been explained patient   PT Total Evaluation Time   PT Eval, Low Complexity Minutes (11777) 10   Therapy Certification   Start of care date 12/17/24   Certification date from 12/17/24   Certification date to 12/20/24   Physical Therapy Goals   PT Frequency 2x/day   PT Predicted Duration/Target Date for Goal Attainment 12/20/24   PT Goals Transfers;Gait;Stairs;PT Goal 1   PT: Transfers Modified independent;Sit to/from stand;Assistive device;Within precautions   PT: Gait Modified independent;Rolling walker;Within precautions;100 feet   PT: Stairs Supervision/stand-by assist;4 stairs;Rail on both sides;Within precautions   PT: Goal 1 Indep with HEP   Interventions   Interventions Quick Adds Gait Training;Therapeutic Procedure   Therapeutic Procedure/Exercise   Ther. Procedure: strength, endurance, ROM, flexibillity Minutes (41034) 8   Symptoms Noted During/After Treatment increased pain   Treatment Detail/Skilled Intervention post op spine supine HEP in recliner, ap, qs, gs x 10, instructed on home exercise program   Gait Training   Gait Training Minutes (35453) 23   Symptoms Noted During/After Treatment (Gait Training) increased pain   Treatment Detail/Skilled Intervention slow gait, stable, vc for posture numerous times. Paitnet ambulating  iniitally wiht his 4 wheeled walker.  Raised handles up on walker, patient found that better walked 100',  rested then had him try FWW and that even better and maintained better posture.    Encouraging patient to use FWW instead of 4 ww at this time.  Encouraged to ambulate with nursing frequently as tolerated.  Up/down 4 steps with cga   Distance in Feet 110, 100, 120,100   [place] Level (Gait Training) stand-by assist   Physical Assistance Level (Gait Training) supervision;verbal cues;1 person assist   Weight Bearing (Gait Training) full weight-bearing   Assistive Device (Gait Training) rolling walker   Pattern Analysis (Gait Training) swing-through gait   Gait Analysis Deviations decreased jose;decreased step length;decreased stride length;decreased toe-to-floor clearance;decreased weight-shifting ability   Impairments (Gait Analysis/Training) pain;strength decreased;balance impaired   Stair Railings present at both sides   Physical Assist/Nonphysical Assist (Stairs) 1 person assist;verbal cues;supervision   Level of [place] (Stairs) contact guard   PT Discharge Planning   PT Plan progress with gait/stairs, standing HEP   PT Discharge Recommendation (DC Rec) home with assist   PT Rationale for DC Rec Patient has good home setup and support, moving well overall   PT Brief overview of current status Patient SBA for gait/transfers   PT Equipment Needed at Discharge walker, rolling   Physical Therapy Time and Intention   Timed Code Treatment Minutes 31   Total Session Time (sum of timed and untimed services) 41   [facility]  OUTPATIENT PHYSICAL THERAPY EVALUATION  PLAN OF TREATMENT FOR OUTPATIENT REHABILITATION  (COMPLETE FOR INITIAL CLAIMS ONLY)  Patient's Last Name, First Name, M.I.  YOB: 1960  Yash Ribera                        Provider's Name  [facility] Medical Record No.  [id number]                             Onset  Date:  12/16/24   Start of Care Date:  12/17/24   Type:     _X_PT   ___OT   ___SLP Medical Diagnosis:                 PT Diagnosis:  Impaired functional mobility Visits from SOC:  1     See note for plan of treatment, functional goals and certification details    I CERTIFY THE NEED FOR THESE SERVICES FURNISHED UNDER        THIS PLAN OF TREATMENT AND WHILE UNDER MY CARE     (Physician co-signature of this document indicates review and certification of the therapy plan).

## 2024-12-17 NOTE — PROGRESS NOTES
"[facility] Orthopaedics Progress Note      Post-operative Day: 1 Day Post-Op    Procedure(s):  LEFT LUMBAR 3 - LUMBAR 4 TRANSFORAMINAL LUMBAR INTERBODY FUSION WITH STEALTH NAVIGATION  Bilateral Lumbar 3-4 decompression with total left-sided facetectomy      Subjective: Patient seen up resting in bedside chair.  No acute events overnight.  Reports pain has been tolerable on p.o. analgesics.  Tolerating a regular diet with no nausea or vomiting.  Believes that radicular symptoms from prior to surgery are starting to improve.  Has been able to empty bladder and has had a BM since surgery.     Pain: moderate  Chest pain, SOB:  No  Drain: 90 ml out overnight     Objective:  Blood pressure 112/59, pulse 76, temperature 99.4  F (37.4  C), temperature source Oral, resp. rate 16, height 1.854 m (6' 1\"), weight 129.3 kg (285 lb), SpO2 91%.    Patient Vitals for the past 24 hrs:   BP Temp Temp src Pulse Resp SpO2   12/17/24 0733 112/59 99.4  F (37.4  C) Oral 76 16 91 %   12/17/24 0300 127/59 98.5  F (36.9  C) Oral 79 16 92 %   12/17/24 0017 105/60 -- -- -- -- 94 %   12/17/24 0005 95/54 -- -- 81 14 94 %   12/16/24 2043 122/59 -- -- 69 20 95 %   12/16/24 1920 -- -- -- 77 18 93 %   12/16/24 1720 122/59 98.5  F (36.9  C) Oral 85 18 93 %   12/16/24 1641 -- -- -- -- -- 92 %   12/16/24 1558 120/59 98.3  F (36.8  C) Oral 82 16 92 %   12/16/24 1409 120/62 97.5  F (36.4  C) Oral 70 12 94 %   12/16/24 1339 114/58 -- Oral 73 16 93 %   12/16/24 1311 -- -- -- 67 -- 92 %       Wt Readings from Last 4 Encounters:   12/16/24 129.3 kg (285 lb)   04/19/24 134.7 kg (297 lb)   04/03/24 134.7 kg (297 lb)   03/08/24 131.5 kg (290 lb)       General: Alert and orientated, NAD  Respiratory: Non-labored breathing on RA  BLE motor function, sensation, and circulation intact   Yes and at baseline Dorsiflexion/plantarflexion intact  right 4/5, left 5/5. Reports this is baseline Moves all other extremities with ease and purpose   Wound status: incisions " are clean dry and intact. Yes, hemovac drain with bloody drainage  Calf tenderness: Bilateral  No    Pertinent Labs   Lab Results: personally reviewed.     Recent Labs   Lab Test 12/17/24  0539   WBC 10.7   HGB 12.4*   HCT 35.6*   MCV 89             Plan:   Continue to monitor drain output DC when less than 30 mL per shift  Anticoagulation protocol: Mechanical and/or ambulation     Pain medications:  scopainmedication: oxycodone and tylenol, will add Robaxin   Weight bearing status:  WBAT, strict no heavy lifting, twisting, or bending requirements for the next 3 months   Disposition:  Home pending drain output, cleared by therapies and hospitalist    Continue cares and rehabilitation     Report completed by:  NOÉ Waldron CNP  Date: 12/17/2024  Time: 1:07 PM

## 2024-12-17 NOTE — PLAN OF CARE
Problem: Spinal Surgery  Goal: Optimal Functional Ability  Outcome: Progressing  Intervention: Optimize Functional Status  Recent Flowsheet Documentation  Taken 12/17/2024 0733 by Berhane Khan RN  Activity Management:   dorsiflexion/plantar flexion performed   ambulated in room   up in chair     Problem: Spinal Surgery  Goal: Absence of Bleeding  Outcome: Progressing     Problem: Spinal Surgery  Goal: Optimal Pain Control and Function  Outcome: Progressing  Intervention: Prevent or Manage Pain  Recent Flowsheet Documentation  Taken 12/17/2024 0733 by Berhane Khan RN  Pain Management Interventions:   medication (see MAR)   pain management plan reviewed with patient/caregiver   repositioned   pillow support provided   Goal Outcome Evaluation:  Patient vital signs are at baseline: Yes  Patient able to ambulate as they were prior to admission or with assist devices provided by therapies during their stay:  Yes  Patient MUST void prior to discharge:  Yes  Patient able to tolerate oral intake:  Yes  Pain has adequate pain control using Oral analgesics:  No, Reason: IV Dilaudid given x1  Does patient have an identified :  Yes  Has goal D/C date and time been discussed with patient:  Yes    Discharge pending drain output.

## 2024-12-17 NOTE — PROGRESS NOTES
"[facility]    Medicine Progress Note - Hospitalist Service    Date of Admission:  12/16/2024    Assessment & Plan   Yash Ribera is a 64 year old male admitted on 12/16/2024.  He has a past medical history significant for COPD, cardiac arrhythmia who presents to the hospital for elective L3-L4 lumbar fusion.  Hospitalist medicine consulted for management of chronic medical additions.  Hospital course complicated with uncontrolled pain.    COPD  No signs of COPD exacerbation    Plan  Continue home inhalers (patient would like to utilize his home inhalers--communicated with patient's RN)    Cardiac arrhythmia  Frequent PVC on for examination    Plan  Continue home metoprolol    Status post L3-L4 fusion  Pain is not well-controlled.  Surgical drain in place.    Plan  DVT prophylaxis and pain management as per primary team  Surgical drain management as per primary team            Diet: Regular Diet Adult  Discharge Instruction - Regular Diet Adult    DVT Prophylaxis: Defer to primary service  Black Catheter: Not present  Lines: None     Cardiac Monitoring: None  Code Status: Full Code      Clinically Significant Risk Factors Present on Admission                             # Obesity: Estimated body mass index is 37.6 kg/m  as calculated from the following:    Height as of this encounter: 1.854 m (6' 1\").    Weight as of this encounter: 129.3 kg (285 lb).              Social Drivers of Health    Tobacco Use: Medium Risk (12/16/2024)    Patient History     Smoking Tobacco Use: Former     Smokeless Tobacco Use: Never   Alcohol Use: Unknown (4/29/2019)    Received from [facility]    AUDIT-C     Frequency of Alcohol Consumption: 4 or more times a week          Disposition Plan     Medically Ready for Discharge: Anticipated Tomorrow             EFRAÍN ENGLAND MD  Hospitalist Service  [facility]  Securely message with [employer] (more info)  Text page via " [facility] Paging/Directory   ______________________________________________________________________    Interval History   Evaluated around 4 PM.  The patient stated that his pain is currently around 7 out of 10.  He denies any chest pain or shortness of breath.  He denies any nausea or vomiting.  He is able to urinate.  He denies any lightheadedness.    Physical Exam   Vital Signs: Temp: 99  F (37.2  C) Temp src: Oral BP: 104/59 Pulse: 72   Resp: 16 SpO2: 94 % O2 Device: None (Room air) Oxygen Delivery: 1 LPM  Weight: 285 lbs 0 oz    Physical Exam  Constitutional:       General: He is not in acute distress.     Appearance: He is not toxic-appearing.   Pulmonary:      Effort: Pulmonary effort is normal.   Skin:     General: Skin is warm and dry.   Neurological:      Mental Status: He is alert.   Psychiatric:         Mood and Affect: Mood normal.          Medical Decision Making       43 MINUTES SPENT BY ME on the date of service doing chart review, history, exam, documentation & further activities per the note.      Data     I have personally reviewed the following data over the past 24 hrs:    10.7  \   12.4 (L)   / 247     137 103 11.8 /  141 (H)   4.0 23 0.81 \       Imaging results reviewed over the past 24 hrs:   No results found for this or any previous visit (from the past 24 hours).

## 2024-12-18 ENCOUNTER — APPOINTMENT (OUTPATIENT)
Dept: OCCUPATIONAL THERAPY | Facility: CLINIC | Age: 64
DRG: 402 | End: 2024-12-18
Attending: ORTHOPAEDIC SURGERY
Payer: MEDICARE

## 2024-12-18 LAB
ERYTHROCYTE [DISTWIDTH] IN BLOOD BY AUTOMATED COUNT: 12.3 % (ref 10–15)
HCT VFR BLD AUTO: 39 % (ref 40–53)
HGB BLD-MCNC: 13.2 G/DL (ref 13.3–17.7)
HOLD SPECIMEN: NORMAL
MCH RBC QN AUTO: 30.9 PG (ref 26.5–33)
MCHC RBC AUTO-ENTMCNC: 33.8 G/DL (ref 31.5–36.5)
MCV RBC AUTO: 91 FL (ref 78–100)
PLATELET # BLD AUTO: 257 10E3/UL (ref 150–450)
RBC # BLD AUTO: 4.27 10E6/UL (ref 4.4–5.9)
WBC # BLD AUTO: 8.2 10E3/UL (ref 4–11)

## 2024-12-18 PROCEDURE — 94640 AIRWAY INHALATION TREATMENT: CPT

## 2024-12-18 PROCEDURE — 120N000001 HC R&B MED SURG/OB

## 2024-12-18 PROCEDURE — 250N000013 HC RX MED GY IP 250 OP 250 PS 637: Performed by: ORTHOPAEDIC SURGERY

## 2024-12-18 PROCEDURE — 250N000009 HC RX 250: Performed by: STUDENT IN AN ORGANIZED HEALTH CARE EDUCATION/TRAINING PROGRAM

## 2024-12-18 PROCEDURE — 250N000011 HC RX IP 250 OP 636: Mod: JZ | Performed by: NURSE PRACTITIONER

## 2024-12-18 PROCEDURE — 99232 SBSQ HOSP IP/OBS MODERATE 35: CPT | Performed by: STUDENT IN AN ORGANIZED HEALTH CARE EDUCATION/TRAINING PROGRAM

## 2024-12-18 PROCEDURE — 36415 COLL VENOUS BLD VENIPUNCTURE: CPT | Performed by: ORTHOPAEDIC SURGERY

## 2024-12-18 PROCEDURE — 85027 COMPLETE CBC AUTOMATED: CPT | Performed by: ORTHOPAEDIC SURGERY

## 2024-12-18 PROCEDURE — 999N000157 HC STATISTIC RCP TIME EA 10 MIN

## 2024-12-18 PROCEDURE — 97535 SELF CARE MNGMENT TRAINING: CPT | Mod: GO

## 2024-12-18 PROCEDURE — 250N000013 HC RX MED GY IP 250 OP 250 PS 637: Performed by: STUDENT IN AN ORGANIZED HEALTH CARE EDUCATION/TRAINING PROGRAM

## 2024-12-18 PROCEDURE — 250N000013 HC RX MED GY IP 250 OP 250 PS 637: Performed by: NURSE PRACTITIONER

## 2024-12-18 RX ORDER — CEFAZOLIN SODIUM 2 G/100ML
2 INJECTION, SOLUTION INTRAVENOUS EVERY 8 HOURS
Status: COMPLETED | OUTPATIENT
Start: 2024-12-18 | End: 2024-12-19

## 2024-12-18 RX ADMIN — HYDROMORPHONE HYDROCHLORIDE 2 MG: 2 TABLET ORAL at 12:41

## 2024-12-18 RX ADMIN — THERA TABS 1 TABLET: TAB at 08:07

## 2024-12-18 RX ADMIN — MAGNESIUM HYDROXIDE 30 ML: 2400 SUSPENSION ORAL at 00:05

## 2024-12-18 RX ADMIN — SENNOSIDES AND DOCUSATE SODIUM 1 TABLET: 50; 8.6 TABLET ORAL at 21:45

## 2024-12-18 RX ADMIN — CEFAZOLIN SODIUM 2 G: 2 INJECTION, SOLUTION INTRAVENOUS at 21:47

## 2024-12-18 RX ADMIN — ACETAMINOPHEN 975 MG: 325 TABLET ORAL at 14:54

## 2024-12-18 RX ADMIN — HYDROMORPHONE HYDROCHLORIDE 2 MG: 2 TABLET ORAL at 21:50

## 2024-12-18 RX ADMIN — HYDROMORPHONE HYDROCHLORIDE 2 MG: 2 TABLET ORAL at 05:07

## 2024-12-18 RX ADMIN — POLYETHYLENE GLYCOL 3350 17 G: 17 POWDER, FOR SOLUTION ORAL at 08:07

## 2024-12-18 RX ADMIN — OLOPATADINE HYDROCHLORIDE 1 DROP: 1 SOLUTION OPHTHALMIC at 08:07

## 2024-12-18 RX ADMIN — IPRATROPIUM BROMIDE AND ALBUTEROL SULFATE 3 ML: .5; 3 SOLUTION RESPIRATORY (INHALATION) at 05:25

## 2024-12-18 RX ADMIN — IPRATROPIUM BROMIDE AND ALBUTEROL SULFATE 3 ML: .5; 3 SOLUTION RESPIRATORY (INHALATION) at 17:26

## 2024-12-18 RX ADMIN — ACETAMINOPHEN 975 MG: 325 TABLET ORAL at 21:45

## 2024-12-18 RX ADMIN — CEFAZOLIN SODIUM 2 G: 2 INJECTION, SOLUTION INTRAVENOUS at 12:58

## 2024-12-18 RX ADMIN — HYDROMORPHONE HYDROCHLORIDE 2 MG: 2 TABLET ORAL at 21:45

## 2024-12-18 RX ADMIN — HYDROMORPHONE HYDROCHLORIDE 4 MG: 2 TABLET ORAL at 15:47

## 2024-12-18 RX ADMIN — METHOCARBAMOL 500 MG: 500 TABLET ORAL at 06:53

## 2024-12-18 RX ADMIN — METOPROLOL SUCCINATE 25 MG: 25 TABLET, EXTENDED RELEASE ORAL at 08:06

## 2024-12-18 RX ADMIN — TETRAHYDROZOLINE HCL 1 DROP: 0.05 SOLUTION/ DROPS OPHTHALMIC at 18:05

## 2024-12-18 RX ADMIN — SENNOSIDES AND DOCUSATE SODIUM 1 TABLET: 50; 8.6 TABLET ORAL at 08:07

## 2024-12-18 RX ADMIN — ACETAMINOPHEN 975 MG: 325 TABLET ORAL at 05:07

## 2024-12-18 ASSESSMENT — ACTIVITIES OF DAILY LIVING (ADL)
ADLS_ACUITY_SCORE: 47
ADLS_ACUITY_SCORE: 40
ADLS_ACUITY_SCORE: 47
ADLS_ACUITY_SCORE: 40
ADLS_ACUITY_SCORE: 47
ADLS_ACUITY_SCORE: 40
ADLS_ACUITY_SCORE: 47
ADLS_ACUITY_SCORE: 40
ADLS_ACUITY_SCORE: 47
ADLS_ACUITY_SCORE: 40
ADLS_ACUITY_SCORE: 47
ADLS_ACUITY_SCORE: 40
ADLS_ACUITY_SCORE: 47
ADLS_ACUITY_SCORE: 47
ADLS_ACUITY_SCORE: 40

## 2024-12-18 NOTE — PLAN OF CARE
Patient vital signs are at baseline: Yes  Patient able to ambulate as they were prior to admission or with assist devices provided by therapies during their stay:  Yes  Patient MUST void prior to discharge:  Yes  Patient able to tolerate oral intake:  Yes  Pain has adequate pain control using Oral analgesics:  Yes  Does patient have an identified :  Yes  Has goal D/C date and time been discussed with patient:  Yes pending drain output      Problem: Adult Inpatient Plan of Care  Goal: Absence of Hospital-Acquired Illness or Injury  Outcome: Progressing  Intervention: Identify and Manage Fall Risk  Recent Flowsheet Documentation  Taken 12/17/2024 2015 by Lee Cavazos RN  Safety Promotion/Fall Prevention: safety round/check completed  Intervention: Prevent Skin Injury  Recent Flowsheet Documentation  Taken 12/18/2024 0130 by Lee Cavazos RN  Body Position:   left   turned   position changed independently  Taken 12/17/2024 2015 by Lee Cavazos RN  Body Position:   left   turned   position changed independently  Intervention: Prevent and Manage VTE (Venous Thromboembolism) Risk  Recent Flowsheet Documentation  Taken 12/18/2024 0130 by Lee Cavazos RN  VTE Prevention/Management: SCDs off (sequential compression devices)  Taken 12/17/2024 2015 by Lee Cavazos RN  VTE Prevention/Management: SCDs off (sequential compression devices)  Intervention: Prevent Infection  Recent Flowsheet Documentation  Taken 12/17/2024 2015 by Lee Cavazos RN  Infection Prevention:   hand hygiene promoted   rest/sleep promoted   single patient room provided

## 2024-12-18 NOTE — PROGRESS NOTES
"[facility]    Medicine Progress Note - Hospitalist Service    Date of Admission:  12/16/2024    Assessment & Plan   Yash Ribera is a 64 year old male admitted on 12/16/2024.  He has a past medical history significant for COPD, cardiac arrhythmia who presents to the hospital for elective L3-L4 lumbar fusion.  Hospitalist medicine consulted for management of chronic medical additions.  Possible discharge on 12/19.    COPD  No signs of exacerbation    Plan  Continue home inhalers.    Cardiac arrhythmia  Frequent PVC on for examination    Plan  Continue home metoprolol with holding parameters    Status post L3-L4 fusion  Pain is not well-controlled.  Surgical drain in place.    Plan  DVT prophylaxis and pain management as per primary team  Surgical drain management as per primary team            Diet: Regular Diet Adult  Discharge Instruction - Regular Diet Adult    DVT Prophylaxis: Defer to primary service  Black Catheter: Not present  Lines: None     Cardiac Monitoring: None  Code Status: Full Code      Clinically Significant Risk Factors Present on Admission                             # Obesity: Estimated body mass index is 37.6 kg/m  as calculated from the following:    Height as of this encounter: 1.854 m (6' 1\").    Weight as of this encounter: 129.3 kg (285 lb).              Social Drivers of Health    Tobacco Use: Medium Risk (12/16/2024)    Patient History     Smoking Tobacco Use: Former     Smokeless Tobacco Use: Never   Alcohol Use: Unknown (4/29/2019)    [facility]     Frequency of Alcohol Consumption: 4 or more times a week          Disposition Plan     Medically Ready for Discharge: Anticipated Tomorrow             EFRAÍN ENGLAND MD  Hospitalist Service  [facility]  Securely message with [id number] (more info)  Text page via [employer] Paging/Directory "   ______________________________________________________________________    Interval History   Pain is better controlled over the last 24 hours.  He is able to urinate.  Had a bowel movement.  Denies any chest pain.  Had some mild lightheadedness.    Physical Exam   Vital Signs: Temp: 98.3  F (36.8  C) Temp src: Oral BP: 122/60 Pulse: 74   Resp: 16 SpO2: 94 % O2 Device: None (Room air)    Weight: 285 lbs 0 oz    Physical Exam  Constitutional:       General: He is not in acute distress.     Appearance: He is not toxic-appearing.   Pulmonary:      Effort: Pulmonary effort is normal.   Skin:     General: Skin is warm and dry.   Neurological:      Mental Status: He is alert.   Psychiatric:         Mood and Affect: Mood normal.          Medical Decision Making       45 MINUTES SPENT BY ME on the date of service doing chart review, history, exam, documentation & further activities per the note.      Data     I have personally reviewed the following data over the past 24 hrs:    8.2  \   13.2 (L)   / 257     N/A N/A N/A /  N/A   N/A N/A N/A \       Imaging results reviewed over the past 24 hrs:   No results found for this or any previous visit (from the past 24 hours).

## 2024-12-18 NOTE — PLAN OF CARE
"Goal Outcome Evaluation:      Problem: Adult Inpatient Plan of Care  Goal: Patient-Specific Goal (Individualized)  Description: You can add care plan individualizations to a care plan. Examples of Individualization might be:  \"Parent requests to be called daily at 9am for status\", \"I have a hard time hearing out of my right ear\", or \"Do not touch me to wake me up as it startles  me\".  Outcome: Progressing   Patient vital signs are at baseline: Yes  Patient able to ambulate as they were prior to admission or with assist devices provided by therapies during their stay:  Yes  Patient MUST void prior to discharge:  Yes  Patient able to tolerate oral intake:  Yes  Pain has adequate pain control using Oral analgesics:  Yes  Does patient have an identified :  Yes  Has goal D/C date and time been discussed with patient:  Yes     Pt A0x4 throughout the shift, pain managed with scheduled and PRN medications, hemovac intact and draining appropriately, dressing CDI, discharge pending drain and output, alarms on and audible, call light within reach.   "

## 2024-12-18 NOTE — PROGRESS NOTES
Physical Therapy Discharge Summary    Reason for therapy discharge:    No further expectations of functional progress.    Progress towards therapy goal(s). See goals on Care Plan in [facility] electronic health record for goal details.  Goals partially met.  Barriers to achieving goals:   limited tolerance for therapy.  Patient declines need to work with therapy at this time. Feels comfortable with mobility, has been walking with nursing in hallways.    Therapy recommendation(s):    Continue home exercise program.

## 2024-12-18 NOTE — PLAN OF CARE
Patient vital signs are at baseline: Yes  Patient able to ambulate as they were prior to admission or with assist devices provided by therapies during their stay:  Yes  Patient MUST void prior to discharge:  Yes  Patient able to tolerate oral intake:  Yes  Pain has adequate pain control using Oral analgesics:  Yes  Does patient have an identified :  Yes  Has goal D/C date and time been discussed with patient:  Yes     Patient discharge pending determined by drain output. On overnight (12 hours) total was 185 ml.      Problem: Adult Inpatient Plan of Care  Goal: Absence of Hospital-Acquired Illness or Injury  12/18/2024 0324 by Lee Cavazos RN  Outcome: Progressing  12/18/2024 0142 by Lee Cavazos RN  Outcome: Progressing  Intervention: Identify and Manage Fall Risk  Recent Flowsheet Documentation  Taken 12/17/2024 2015 by Lee Cavazos RN  Safety Promotion/Fall Prevention: safety round/check completed  Intervention: Prevent Skin Injury  Recent Flowsheet Documentation  Taken 12/18/2024 0130 by Lee Cavazos RN  Body Position:   left   turned   position changed independently  Taken 12/17/2024 2015 by Lee Cavazos RN  Body Position:   left   turned   position changed independently  Intervention: Prevent and Manage VTE (Venous Thromboembolism) Risk  Recent Flowsheet Documentation  Taken 12/18/2024 0130 by Lee Cavazos RN  VTE Prevention/Management: SCDs off (sequential compression devices)  Taken 12/17/2024 2015 by Lee Cavazos RN  VTE Prevention/Management: SCDs off (sequential compression devices)  Intervention: Prevent Infection  Recent Flowsheet Documentation  Taken 12/17/2024 2015 by Lee Cavazos RN  Infection Prevention:   hand hygiene promoted   rest/sleep promoted   single patient room provided

## 2024-12-18 NOTE — PROGRESS NOTES
"[facility] Orthopaedics Progress Note      Post-operative Day: 2 Day Post-Op    Procedure(s):  LEFT LUMBAR 3 - LUMBAR 4 TRANSFORAMINAL LUMBAR INTERBODY FUSION WITH STEALTH NAVIGATION  Bilateral Lumbar 3-4 decompression with total left-sided facetectomy      Subjective: Patient seen up resting in bedside chair. No acute events overnight. Pain to lumbar spine, worse with movement. Improved from yesterday. BM this AM, voiding without difficulty and passing gas    Pain: moderate  Chest pain, SOB:  No  Drain: 85 ml out overnight     Objective:  Blood pressure 122/60, pulse 74, temperature 98.3  F (36.8  C), temperature source Oral, resp. rate 16, height 1.854 m (6' 1\"), weight 129.3 kg (285 lb), SpO2 94%.    Patient Vitals for the past 24 hrs:   BP Temp Temp src Pulse Resp SpO2   12/18/24 0810 -- 98.3  F (36.8  C) Oral -- -- --   12/18/24 0806 122/60 98.3  F (36.8  C) Oral 74 16 --   12/17/24 2334 94/55 98.3  F (36.8  C) Oral 71 16 94 %   12/17/24 1518 104/59 99  F (37.2  C) Oral 72 16 94 %       Wt Readings from Last 4 Encounters:   12/16/24 129.3 kg (285 lb)   04/19/24 134.7 kg (297 lb)   04/03/24 134.7 kg (297 lb)   03/08/24 131.5 kg (290 lb)       General: Alert and orientated, NAD  Respiratory: Non-labored breathing on RA  BLE motor function, sensation, and circulation intact   Yes and at baseline Dorsiflexion/plantarflexion intact  right 4/5, left 5/5. Reports this is baseline Moves all other extremities with ease and purpose   Wound status: incisions are clean dry and intact. Yes, hemovac drain with bloody drainage  Calf tenderness: Bilateral  No    Pertinent Labs   Lab Results: personally reviewed.     Recent Labs   Lab Test 12/17/24  0539   WBC 10.7   HGB 12.4*   HCT 35.6*   MCV 89             Plan:   Continue to monitor drain output DC when less than 30 mL per shift, will add Ancef for additional day given ongoing drain.   Anticoagulation protocol: Mechanical and/or ambulation     Pain " medications:  Multimodal approach with ice,  oxycodone rotated to Dilaudid and tylenol, will add Robaxin   Weight bearing status:  WBAT, strict no heavy lifting, twisting, or bending requirements for the next 3 months   Disposition:  Home pending drain output, cleared by therapies and hospitalist    Continue cares and rehabilitation     Report completed by:  NOÉ Waldron CNP  Date: 12/18/2024  Time: 11:09 PM

## 2024-12-19 PROCEDURE — 250N000013 HC RX MED GY IP 250 OP 250 PS 637: Performed by: NURSE PRACTITIONER

## 2024-12-19 PROCEDURE — 99232 SBSQ HOSP IP/OBS MODERATE 35: CPT | Performed by: STUDENT IN AN ORGANIZED HEALTH CARE EDUCATION/TRAINING PROGRAM

## 2024-12-19 PROCEDURE — 250N000013 HC RX MED GY IP 250 OP 250 PS 637: Performed by: ORTHOPAEDIC SURGERY

## 2024-12-19 PROCEDURE — 250N000011 HC RX IP 250 OP 636: Performed by: ORTHOPAEDIC SURGERY

## 2024-12-19 PROCEDURE — 250N000009 HC RX 250: Performed by: STUDENT IN AN ORGANIZED HEALTH CARE EDUCATION/TRAINING PROGRAM

## 2024-12-19 PROCEDURE — 250N000009 HC RX 250: Performed by: NURSE PRACTITIONER

## 2024-12-19 PROCEDURE — 99222 1ST HOSP IP/OBS MODERATE 55: CPT | Performed by: NURSE PRACTITIONER

## 2024-12-19 PROCEDURE — 250N000011 HC RX IP 250 OP 636: Mod: JZ | Performed by: NURSE PRACTITIONER

## 2024-12-19 PROCEDURE — 120N000001 HC R&B MED SURG/OB

## 2024-12-19 RX ORDER — ACETAMINOPHEN 325 MG/1
975 TABLET ORAL EVERY 6 HOURS
Status: DISCONTINUED | OUTPATIENT
Start: 2024-12-19 | End: 2024-12-20 | Stop reason: HOSPADM

## 2024-12-19 RX ORDER — POLYETHYLENE GLYCOL 3350 17 G/17G
17 POWDER, FOR SOLUTION ORAL 2 TIMES DAILY PRN
Status: DISCONTINUED | OUTPATIENT
Start: 2024-12-19 | End: 2024-12-20 | Stop reason: HOSPADM

## 2024-12-19 RX ORDER — PREGABALIN 25 MG/1
25 CAPSULE ORAL 2 TIMES DAILY
Status: DISCONTINUED | OUTPATIENT
Start: 2024-12-19 | End: 2024-12-20 | Stop reason: HOSPADM

## 2024-12-19 RX ORDER — METHOCARBAMOL 500 MG/1
500 TABLET, FILM COATED ORAL 4 TIMES DAILY
Status: DISCONTINUED | OUTPATIENT
Start: 2024-12-19 | End: 2024-12-20 | Stop reason: HOSPADM

## 2024-12-19 RX ORDER — SENNOSIDES 8.6 MG
8.6 TABLET ORAL 2 TIMES DAILY PRN
Status: DISCONTINUED | OUTPATIENT
Start: 2024-12-19 | End: 2024-12-20 | Stop reason: HOSPADM

## 2024-12-19 RX ORDER — LIDOCAINE 50 MG/G
OINTMENT TOPICAL 4 TIMES DAILY
Status: DISCONTINUED | OUTPATIENT
Start: 2024-12-19 | End: 2024-12-20 | Stop reason: HOSPADM

## 2024-12-19 RX ADMIN — SENNOSIDES AND DOCUSATE SODIUM 1 TABLET: 50; 8.6 TABLET ORAL at 20:51

## 2024-12-19 RX ADMIN — POLYETHYLENE GLYCOL 3350 17 G: 17 POWDER, FOR SOLUTION ORAL at 04:50

## 2024-12-19 RX ADMIN — HYDROMORPHONE HYDROCHLORIDE 4 MG: 2 TABLET ORAL at 13:01

## 2024-12-19 RX ADMIN — SENNOSIDES AND DOCUSATE SODIUM 1 TABLET: 50; 8.6 TABLET ORAL at 08:32

## 2024-12-19 RX ADMIN — METHOCARBAMOL 500 MG: 500 TABLET ORAL at 01:38

## 2024-12-19 RX ADMIN — ACETAMINOPHEN 975 MG: 325 TABLET ORAL at 17:30

## 2024-12-19 RX ADMIN — OLOPATADINE HYDROCHLORIDE 1 DROP: 1 SOLUTION OPHTHALMIC at 20:54

## 2024-12-19 RX ADMIN — ACETAMINOPHEN 975 MG: 325 TABLET ORAL at 23:47

## 2024-12-19 RX ADMIN — LIDOCAINE: 50 OINTMENT TOPICAL at 20:51

## 2024-12-19 RX ADMIN — LIDOCAINE: 50 OINTMENT TOPICAL at 17:31

## 2024-12-19 RX ADMIN — HYDROMORPHONE HYDROCHLORIDE 4 MG: 2 TABLET ORAL at 23:48

## 2024-12-19 RX ADMIN — PREGABALIN 25 MG: 25 CAPSULE ORAL at 20:52

## 2024-12-19 RX ADMIN — HYDROMORPHONE HYDROCHLORIDE 4 MG: 2 TABLET ORAL at 01:38

## 2024-12-19 RX ADMIN — METHOCARBAMOL 500 MG: 500 TABLET ORAL at 20:52

## 2024-12-19 RX ADMIN — CEFAZOLIN SODIUM 2 G: 2 INJECTION, SOLUTION INTRAVENOUS at 04:58

## 2024-12-19 RX ADMIN — METHOCARBAMOL 500 MG: 500 TABLET ORAL at 08:32

## 2024-12-19 RX ADMIN — ONDANSETRON 4 MG: 4 TABLET, ORALLY DISINTEGRATING ORAL at 05:55

## 2024-12-19 RX ADMIN — HYDROMORPHONE HYDROCHLORIDE 4 MG: 2 TABLET ORAL at 19:44

## 2024-12-19 RX ADMIN — IPRATROPIUM BROMIDE AND ALBUTEROL SULFATE 3 ML: .5; 3 SOLUTION RESPIRATORY (INHALATION) at 06:01

## 2024-12-19 RX ADMIN — MAGNESIUM HYDROXIDE 30 ML: 2400 SUSPENSION ORAL at 12:58

## 2024-12-19 RX ADMIN — HYDROMORPHONE HYDROCHLORIDE 4 MG: 2 TABLET ORAL at 06:19

## 2024-12-19 RX ADMIN — THERA TABS 1 TABLET: TAB at 08:32

## 2024-12-19 RX ADMIN — METHOCARBAMOL 500 MG: 500 TABLET ORAL at 16:32

## 2024-12-19 RX ADMIN — OLOPATADINE HYDROCHLORIDE 1 DROP: 1 SOLUTION OPHTHALMIC at 08:34

## 2024-12-19 ASSESSMENT — ACTIVITIES OF DAILY LIVING (ADL)
ADLS_ACUITY_SCORE: 46
ADLS_ACUITY_SCORE: 47
ADLS_ACUITY_SCORE: 46
ADLS_ACUITY_SCORE: 47
ADLS_ACUITY_SCORE: 46
ADLS_ACUITY_SCORE: 47
ADLS_ACUITY_SCORE: 46
ADLS_ACUITY_SCORE: 47
DEPENDENT_IADLS:: INDEPENDENT
ADLS_ACUITY_SCORE: 47
ADLS_ACUITY_SCORE: 47
ADLS_ACUITY_SCORE: 46
ADLS_ACUITY_SCORE: 46
ADLS_ACUITY_SCORE: 47

## 2024-12-19 NOTE — PROGRESS NOTES
"[facility] Orthopaedics Progress Note      Post-operative Day: 3 Day Post-Op    Procedure(s):  LEFT LUMBAR 3 - LUMBAR 4 TRANSFORAMINAL LUMBAR INTERBODY FUSION WITH STEALTH NAVIGATION  Bilateral Lumbar 3-4 decompression with total left-sided facetectomy      Subjective: Patient seen up resting at edge of bed. Reports having a difficult night due to pain to lumbar spine. Reports having some numbness to lateral left thigh that is improving since surgery. Is tolerating a regular diet with no nausea or vomiting. Having Bms. Notified by nursing pt sat on spicket of toilet and jolted up bumping his head on side of the wall near toilet. No head pain currently. Feeling unsure about discharge given pain last night.     Pain: moderate  Chest pain, SOB:  No    Objective:  Blood pressure 126/61, pulse 91, temperature 98.4  F (36.9  C), temperature source Oral, resp. rate 16, height 1.854 m (6' 1\"), weight 129.3 kg (285 lb), SpO2 92%.    Patient Vitals for the past 24 hrs:   BP Temp Temp src Pulse Resp SpO2   12/19/24 0900 126/61 98.4  F (36.9  C) Oral 91 16 92 %   12/19/24 0510 109/57 98.5  F (36.9  C) Oral 71 16 92 %   12/18/24 2330 100/50 98.7  F (37.1  C) Oral 76 16 92 %   12/18/24 1759 (!) 140/73 99.3  F (37.4  C) Oral 102 16 94 %   12/18/24 1726 -- -- -- -- 16 92 %       Wt Readings from Last 4 Encounters:   12/16/24 129.3 kg (285 lb)   04/19/24 134.7 kg (297 lb)   04/03/24 134.7 kg (297 lb)   03/08/24 131.5 kg (290 lb)       General: Alert and orientated, NAD  Respiratory: Non-labored breathing on RA  BLE motor function, sensation, and circulation intact   Yes and at baseline Dorsiflexion/plantarflexion intact  right 4/5, left 5/5. Reports this is baseline Moves all other extremities with ease and purpose   Wound status: incisions are clean dry and intact. Yes, hemovac drain with bloody drainage  Calf tenderness: Bilateral  No    Pertinent Labs   Lab Results: personally reviewed.     Recent Labs   Lab Test 12/17/24  0539 "   WBC 10.7   HGB 12.4*   HCT 35.6*   MCV 89             Plan:   Drain out this AM   Anticoagulation protocol: Mechanical and/or ambulation     Pain medications:  Multimodal approach with ice,  oxycodone rotated to Dilaudid and tylenol, will add Robaxin. Given ongoing pain will consult pain team today   Weight bearing status:  WBAT, strict no heavy lifting, twisting, or bending requirements for the next 3 months   Disposition:  Home pending adequate pain control on PO analgesics cleared by therapies and hospitalist. SW consulted as interested in home care options.     Continue cares and rehabilitation     Report completed by:  NOÉ Waldron CNP  Date: 12/19/2024  Time: 11:30 AM

## 2024-12-19 NOTE — CARE PLAN
12/19/24 1100   Fall Event   Patient Assessed By nurse   Name of Provider Notified Dr. Ramos   Family/Designated Caregiver Notified of Fall Yes   Name of Family/Designated Caregiver Notified Tracy Ribera  (patient called wife)   Fall Prevention Plan Updated Yes

## 2024-12-19 NOTE — CONSULTS
Care Management Initial Consult    General Information  Assessment completed with: Patient,    Type of CM/SW Visit: Initial Assessment    Primary Care Provider verified and updated as needed: Yes   Readmission within the last 30 days:        Reason for Consult: discharge planning  Advance Care Planning:            Communication Assessment  Patient's communication style: spoken language (English or Bilingual)    Hearing Difficulty or Deaf: no   Wear Glasses or Blind: yes    Cognitive  Cognitive/Neuro/Behavioral: WDL  Level of Consciousness: alert  Arousal Level: opens eyes spontaneously  Orientation: oriented x 4  Mood/Behavior: cooperative, calm  Best Language: 0 - No aphasia  Speech: clear, spontaneous, logical    Living Environment:   People in home: spouse  Wife Delilah Molina  Current living Arrangements: house      Able to return to prior arrangements: yes       Family/Social Support:  Care provided by: self  Provides care for: no one  Marital Status:   Support system: Wife, Children          Description of Support System: Supportive, Involved         Current Resources:   Patient receiving home care services: No        Community Resources: None  Equipment currently used at home: cane, straight, walker, rolling  Supplies currently used at home: None    Employment/Financial:  Employment Status:          Financial Concerns:             Does the patient's insurance plan have a 3 day qualifying hospital stay waiver?  No    Lifestyle & Psychosocial Needs:  Social Drivers of Health     Food Insecurity: Low Risk  (12/16/2024)    Food Insecurity     Within the past 12 months, did you worry that your food would run out before you got money to buy more?: No     Within the past 12 months, did the food you bought just not last and you didn t have money to get more?: No   Depression: Not on file   Housing Stability: Low Risk  (12/16/2024)    Housing Stability     Do you have housing? : Yes     Are you worried about losing  your housing?: No   Tobacco Use: Medium Risk (12/16/2024)    Patient History     Smoking Tobacco Use: Former     Smokeless Tobacco Use: Never     Passive Exposure: Not on file   Financial Resource Strain: Low Risk  (12/16/2024)    Financial Resource Strain     Within the past 12 months, have you or your family members you live with been unable to get utilities (heat, electricity) when it was really needed?: No   Alcohol Use: Unknown (4/29/2019)    [facility]    AUDIT-C     Frequency of Alcohol Consumption: 4 or more times a week     Average Number of Drinks: Not on file     Frequency of Binge Drinking: Not on file   Transportation Needs: Low Risk  (12/16/2024)    Transportation Needs     Within the past 12 months, has lack of transportation kept you from medical appointments, getting your medicines, non-medical meetings or appointments, work, or from getting things that you need?: No   Physical Activity: Not on file   Interpersonal Safety: Low Risk  (12/18/2024)    Interpersonal Safety     Do you feel physically and emotionally safe where you currently live?: Yes     Within the past 12 months, have you been hit, slapped, kicked or otherwise physically hurt by someone?: No     Within the past 12 months, have you been humiliated or emotionally abused in other ways by your partner or ex-partner?: No   Stress: Not on file   Social Connections: Not on file   Health Literacy: Not on file       Functional Status:  Prior to admission patient needed assistance:   Dependent ADLs:: Independent  Dependent IADLs:: Independent       Mental Health Status:          Chemical Dependency Status:                Values/Beliefs:  Spiritual, Cultural Beliefs, Restoration Practices, Values that affect care: no               Discussed  Partnership in Safe Discharge Planning  document with patient/family: Yes    Additional Information:  3:06 PM  Consult completed. Pt lives in slit level house with wife. Pt  independent at baseline. Pt interested in home care. Referral sent and accepted for Home care PT. No further needs identified. Anticipate family to transport upon discharge.     ERIN MonroyW

## 2024-12-19 NOTE — PROGRESS NOTES
"[facility]    Medicine Progress Note - Hospitalist Service    Date of Admission:  12/16/2024    Assessment & Plan   Yash Ribera is a 64 year old male admitted on 12/16/2024.  He has a past medical history significant for COPD, cardiac arrhythmia who presents to the hospital for elective L3-L4 lumbar fusion.  Hospitalist medicine consulted for management of chronic medical additions.  12/19, pain is not well-controlled.  Pending pain team evaluation.  Possible discharge on 12/20 if pain is well-controlled.    COPD  No signs of exacerbation    Plan  Continue home inhalers.    Cardiac arrhythmia  Frequent PVC on for examination    Plan  Continue home metoprolol with holding parameters    Status post L3-L4 fusion  Pain is not well-controlled.  Surgical drain removed this afternoon.    Plan  DVT prophylaxis and pain management as per primary team              Diet: Regular Diet Adult  Discharge Instruction - Regular Diet Adult    DVT Prophylaxis: Defer to primary service  Black Catheter: Not present  Lines: None     Cardiac Monitoring: None  Code Status: Full Code      Clinically Significant Risk Factors                              # Obesity: Estimated body mass index is 37.6 kg/m  as calculated from the following:    Height as of this encounter: 1.854 m (6' 1\").    Weight as of this encounter: 129.3 kg (285 lb)., PRESENT ON ADMISSION            Social Drivers of Health    Tobacco Use: Medium Risk (12/16/2024)    Patient History     Smoking Tobacco Use: Former     Smokeless Tobacco Use: Never   Alcohol Use: Unknown (4/29/2019)    Received from [employer], [facility]    AUDIT-C     Frequency of Alcohol Consumption: 4 or more times a week          Disposition Plan     Medically Ready for Discharge: Anticipated Tomorrow             EFRAÍN ENGLAND MD  Hospitalist Service  [facility]  Securely message with [employer] (more info)  Text page via [facility] Paging/Directory "   ______________________________________________________________________    Interval History   Continues to experience intermittent uncontrolled pain.  Denies any chest pain or shortness of breath.  Denies any nausea or vomiting.    Physical Exam   Vital Signs: Temp: 98.4  F (36.9  C) Temp src: Oral BP: 126/61 Pulse: 91   Resp: 16 SpO2: 92 % O2 Device: None (Room air)    Weight: 285 lbs 0 oz    Physical Exam  Constitutional:       General: He is not in acute distress.     Appearance: He is not toxic-appearing.   Pulmonary:      Effort: Pulmonary effort is normal.   Skin:     General: Skin is warm and dry.   Neurological:      Mental Status: He is alert.   Psychiatric:         Mood and Affect: Mood normal.          Medical Decision Making       48 MINUTES SPENT BY ME on the date of service doing chart review, history, exam, documentation & further activities per the note.      Data         Imaging results reviewed over the past 24 hrs:   No results found for this or any previous visit (from the past 24 hours).

## 2024-12-19 NOTE — PLAN OF CARE
Goal Outcome Evaluation:      Problem: Spinal Surgery  Goal: Optimal Coping with Surgery  Outcome: Progressing   Patient vital signs are at baseline: Yes  Patient able to ambulate as they were prior to admission or with assist devices provided by therapies during their stay:  Yes  Patient MUST void prior to discharge:  Yes  Patient able to tolerate oral intake:  Yes  Pain has adequate pain control using Oral analgesics:  Yes  Does patient have an identified :  Yes  Has goal D/C date and time been discussed with patient:  Yes     Pt A0x4 throughout the shift, drain pulled this shift, pain being managed with scheduled and PRN medications, ice, rest and topical medications, alarms on and audible, call light within reach.

## 2024-12-19 NOTE — PROGRESS NOTES
"Patient went to the bathroom independently with walker and gripper socks, when he went to sit on the toilet the spicket to rinse a urinal was down and this was not noticed, patient started to sit and felt it on his bottom and testicle, he was holding onto the grab bar and walker, this surprised him and made him \"spring up\" he pulled up with the grab bar and pulled his head and shoulder to the wall, he told this RN he just \"tapped\" his head onto the wall, and his testicle felt tender from the spicket, no reddened areas, scrapes or abrasions noted upon RN assessment, patient denies any pain and reports he felt more embarrassed.  Patient had been requesting to be independent with toileting due to his frequency an shyness, along with doing well with therapy. Paged provider, no new orders, bed alarm on, reeducated patient the importance of asking for help, and utilizing call light.   "

## 2024-12-19 NOTE — CONSULTS
[facility] ACUTE PAIN SERVICE CONSULTATION   [place], [place], [place], [facility], [place]     Date of Admission:  12/16/2024  Date of Consult (When I saw the patient): 12/19/24     Assessment/Plan:     Yash Ribera is a 64 year old male who was admitted on 12/16/2024.  Pain team was asked to see the patient for post op pain. Admitted for planned procedure. History of COPD, cardiac arrhythmia.  Describes pain as 6-8/10 and aching, sharp, tight in the low back and does radiate to the buttocks and at times down the BLEs     Post op day: 3 Days Post-Op. L3-L4 lumbar fusion     PLAN:   1) Pain is consistent with post op pian.   Multimodal Medication Therapy  Topical: lidocaine ointment qid   NSAID'S: none post fusion   Muscle Relaxants: robaxin 500 mg qid   Adjuvants: APAP 975 mg q6h, add Lyrica 25 mg bid (allergies noted to Gabapentin)   Opioids: dilaudid 2-4 mg q4hprn   IV Pain medication: dilaudid 0.2-0.4 mg q2hprn   Non-medication interventions: Ice, Rest, PT, OT, Distraction (TV, Music, Reading), and Meditation  Constipation Prophylaxis: Bisacodyl Suppository, Senna-docusate, and Miralax    -Opioid prescriber has been none  -MN  pulled from system on 12/19/24. This indicates none  Discharge Recommendations - We recommend prescribing the following at the time of discharge: Per Orthopedics     History of Present Illness (HPI):       Yash iRbera is a 64 year old male who presented for planned procedure.  Past medical history as above. The pain is reported to be acute, chronic, located in the low back, and it does radiate to buttocks and BLEs, right worse than left.  Current pain is rated at 6-8/10 and goal is 2-4/10.  The patient denies nausea, vomiting, diarrhea, chest pain, shortness of breath, dizziness, fever, chills, saddle anesthesia, and changes to bowel or bladder habits. The patient reports paresthesia, passing flatus, and no BM yet.     Per MN  review, the patient does not have an  opioid tolerance. Opioid induced side effects noted and include: none    Reviewed medical record, labs, imaging, ED note, and care everywhere.      Medical History   PAST MEDICAL HISTORY:   Past Medical History:   Diagnosis Date    Arrhythmia     Back pain     Emphysema lung (H)     COPD    Gastroesophageal reflux disease with esophagitis     Irregular heart beat     Paralysis of diaphragm     left side partial       PAST SURGICAL HISTORY:   Past Surgical History:   Procedure Laterality Date    BACK SURGERY      FUSION TRANSFORAMINAL LUMBAR INTERBODY, 1 LEVEL, POSTERIOR APPROACH, USING OTS SURG IMAGING GUIDANCE Left 12/16/2024    Procedure: LEFT LUMBAR 3 - LUMBAR 4 TRANSFORAMINAL LUMBAR INTERBODY FUSION WITH STEALTH NAVIGATION;  Surgeon: Travis Coyle MD;  Location: [facility] OR    LAMINECTOMY, SPINE, LUMBAR, MINIMALLY INVASIVE, 1 LEVEL, USING OPTICAL TRACKING SYSTEM Bilateral 12/16/2024    Procedure: Bilateral Lumbar 3-4 decompression with total left-sided facetectomy;  Surgeon: Travis Coyle MD;  Location: [facility] OR    NECK SURGERY      ORTHOPEDIC SURGERY         FAMILY HISTORY: History reviewed.     SOCIAL HISTORY:   Social History     Tobacco Use    Smoking status: Former     Types: Cigarettes    Smokeless tobacco: Never   Substance Use Topics    Alcohol use: Yes     Comment: 3 times/week        HEALTH & LIFESTYLE PRACTICES  Tobacco:  reports that he has quit smoking. His smoking use included cigarettes. He has never used smokeless tobacco.  Alcohol:  reports current alcohol use.  Illicit drugs:  reports no history of drug use.    Allergies  Allergies   Allergen Reactions    Wellbutrin [Bupropion] Hives    Bee Venom      Other Reaction(s): Edema,generalized    Bees     Gabapentin      Feet swelling    Pollen Extract     Shrimp     Shrimp (Diagnostic) Unknown       Problem List  Patient Active Problem List    Diagnosis Date Noted    Lumbar radiculopathy 12/16/2024      "Priority: Medium       Prior to Admission Medications   Medications Prior to Admission   Medication Sig Dispense Refill Last Dose/Taking    acetaminophen (TYLENOL) 325 MG tablet Take 325-650 mg by mouth every 4 hours as needed for pain   12/13/2024    albuterol (PROAIR HFA/PROVENTIL HFA/VENTOLIN HFA) 108 (90 Base) MCG/ACT inhaler Inhale 2 puffs into the lungs every 4 hours as needed for shortness of breath, wheezing or cough.   12/14/2024    aspirin-acetaminophen-caffeine (EXCEDRIN MIGRAINE) 250-250-65 MG tablet Take 2 tablets by mouth daily as needed   12/12/2024    Fluticasone-Umeclidin-Vilanterol (TRELEGY ELLIPTA) 200-62.5-25 MCG/ACT oral inhaler Inhale 1 puff into the lungs daily.   12/16/2024 Morning    ipratropium - albuterol 0.5 mg/2.5 mg/3 mL (DUONEB) 0.5-2.5 (3) MG/3ML neb solution Take 1 vial by nebulization every 6 hours as needed for shortness of breath, wheezing or cough.   12/15/2024    metoprolol succinate ER (TOPROL XL) 25 MG 24 hr tablet Take 1 tablet (25 mg) by mouth daily 90 tablet 3 12/16/2024 Morning    [DISCONTINUED] ibuprofen (ADVIL/MOTRIN) 200 MG tablet Take 200-400 mg by mouth every 6 hours as needed.   12/11/2024       Review of Systems  Complete ROS reviewed, unless noted in HPI, all other systems reviewed (with patient) and all others found to be negative.      Objective:     Physical Exam:  /61 (BP Location: Right arm)   Pulse 91   Temp 98.4  F (36.9  C) (Oral)   Resp 16   Ht 1.854 m (6' 1\")   Wt 129.3 kg (285 lb)   SpO2 92%   BMI 37.60 kg/m    Weight:   Vitals:    12/16/24 0635   Weight: 129.3 kg (285 lb)      Body mass index is 37.6 kg/m .    General Appearance:  Alert, cooperative, no distress   Head:  Normocephalic, without obvious abnormality, atraumatic   Eyes:  PERRL, conjunctiva/corneas clear, EOM's intact   ENT/Throat: Lips, mucosa, and tongue normal; teeth and gums normal   Lymph/Neck: Supple, symmetrical, trachea midline   Lungs:   Clear to auscultation " bilaterally, respirations unlabored, room air    Cardiovascular/Heart:  Regular rate and rhythm, S1, S2 normal   Abdomen:   Soft, non-tender, obese   Musculoskeletal: Extremities normal, atraumatic   Skin: Skin warm, dry, incision is covered    Neurologic: Alert and oriented X 3, Moves all 4 extremities     Psych: Affect is appropriate      Imaging: Reviewed I have personally reviewed pertinent notes, labs, tests, and radiologic imaging in patient's chart.  Labs: Reviewed I have personally reviewed pertinent notes, labs, tests, and radiologic imaging in patient's chart.  Notes: Reviewed I have personally reviewed pertinent notes, labs, tests, and radiologic imaging in patient's chart.    Total time spent 65 minutes with greater than 50% in consultation, education and coordination of care.   Also discussed with RN, Orthopedics, and Pharmacist.   Treatment plan includes: multimodal pain approach, Hospital Medicine Service for medical management, Orthopedics, PT, OT.   Patient educated regarding: multimodal pain approach and medications as listed above.   Elements of Medical Decision Making as described above. Acute or chronic illness or injury or surgery. High risk therapy including opioids, high risk drug therapy including oral and/or parenteral controlled substances.    Patient is understanding of the plan. All questions and concerns addressed to patient's satisfaction.     Thank you for this consultation.    BRENT QuinonezP-MANUEL  Acute Care Inpatient Pain Management Program  [facility] ([facility])  Hours of coverage Monday-Friday [id number]. After hours please contact Primary team   Page via Epic chat or Vocera Messaging

## 2024-12-19 NOTE — PLAN OF CARE
"  Problem: Spinal Surgery  Goal: Optimal Pain Control and Function  Outcome: Progressing   Goal Outcome Evaluation:                      /57 (BP Location: Right arm)   Pulse 71   Temp 98.5  F (36.9  C) (Oral)   Resp 16   Ht 1.854 m (6' 1\")   Wt 129.3 kg (285 lb)   SpO2 92%   BMI 37.60 kg/m      Patient vital signs are at baseline: Yes  Patient able to ambulate as they were prior to admission or with assist devices provided by therapies during their stay:  Yes  Patient MUST void prior to discharge:  Yes  Patient able to tolerate oral intake:  Yes  Pain has adequate pain control using Oral analgesics:  Yes  Does patient have an identified :  Yes  Has goal D/C date and time been discussed with patient:  Yes    Leroy Wilcox RN     Pt Aox4. Up with stand by assist. Pt did have an incident where he felt short of breath, dizzy, and nauseous . It resolved after returning to bed and receiving PRN zofran, and duoneb treatment.    Leroy Wilcox RN    "

## 2024-12-20 VITALS
HEART RATE: 81 BPM | OXYGEN SATURATION: 91 % | RESPIRATION RATE: 16 BRPM | WEIGHT: 285 LBS | HEIGHT: 73 IN | TEMPERATURE: 98.4 F | BODY MASS INDEX: 37.77 KG/M2 | SYSTOLIC BLOOD PRESSURE: 106 MMHG | DIASTOLIC BLOOD PRESSURE: 57 MMHG

## 2024-12-20 VITALS
TEMPERATURE: 97.5 F | RESPIRATION RATE: 16 BRPM | WEIGHT: 285 LBS | HEART RATE: 77 BPM | HEIGHT: 73 IN | SYSTOLIC BLOOD PRESSURE: 123 MMHG | DIASTOLIC BLOOD PRESSURE: 59 MMHG | OXYGEN SATURATION: 94 % | BODY MASS INDEX: 37.77 KG/M2

## 2024-12-20 PROCEDURE — 250N000013 HC RX MED GY IP 250 OP 250 PS 637: Performed by: STUDENT IN AN ORGANIZED HEALTH CARE EDUCATION/TRAINING PROGRAM

## 2024-12-20 PROCEDURE — 250N000013 HC RX MED GY IP 250 OP 250 PS 637: Performed by: ORTHOPAEDIC SURGERY

## 2024-12-20 PROCEDURE — 99232 SBSQ HOSP IP/OBS MODERATE 35: CPT | Performed by: STUDENT IN AN ORGANIZED HEALTH CARE EDUCATION/TRAINING PROGRAM

## 2024-12-20 PROCEDURE — 250N000013 HC RX MED GY IP 250 OP 250 PS 637: Performed by: NURSE PRACTITIONER

## 2024-12-20 PROCEDURE — 99232 SBSQ HOSP IP/OBS MODERATE 35: CPT | Performed by: NURSE PRACTITIONER

## 2024-12-20 RX ORDER — AMOXICILLIN 250 MG
1 CAPSULE ORAL 2 TIMES DAILY
Qty: 42 TABLET | Refills: 0 | Status: SHIPPED | OUTPATIENT
Start: 2024-12-20

## 2024-12-20 RX ORDER — METHOCARBAMOL 500 MG/1
500 TABLET, FILM COATED ORAL 4 TIMES DAILY
Qty: 56 TABLET | Refills: 0 | Status: SHIPPED | OUTPATIENT
Start: 2024-12-20 | End: 2025-01-03

## 2024-12-20 RX ORDER — PREGABALIN 25 MG/1
25 CAPSULE ORAL 2 TIMES DAILY
Qty: 60 CAPSULE | Refills: 0 | Status: SHIPPED | OUTPATIENT
Start: 2024-12-20 | End: 2025-01-19

## 2024-12-20 RX ORDER — HYDROMORPHONE HYDROCHLORIDE 2 MG/1
2-4 TABLET ORAL EVERY 4 HOURS PRN
Qty: 30 TABLET | Refills: 0 | Status: SHIPPED | OUTPATIENT
Start: 2024-12-20

## 2024-12-20 RX ADMIN — ACETAMINOPHEN 975 MG: 325 TABLET ORAL at 14:04

## 2024-12-20 RX ADMIN — LIDOCAINE: 50 OINTMENT TOPICAL at 09:01

## 2024-12-20 RX ADMIN — POLYETHYLENE GLYCOL 3350 17 G: 17 POWDER, FOR SOLUTION ORAL at 00:11

## 2024-12-20 RX ADMIN — THERA TABS 1 TABLET: TAB at 09:00

## 2024-12-20 RX ADMIN — HYDROMORPHONE HYDROCHLORIDE 4 MG: 2 TABLET ORAL at 09:10

## 2024-12-20 RX ADMIN — OLOPATADINE HYDROCHLORIDE 1 DROP: 1 SOLUTION OPHTHALMIC at 09:10

## 2024-12-20 RX ADMIN — ACETAMINOPHEN 975 MG: 325 TABLET ORAL at 09:00

## 2024-12-20 RX ADMIN — HYDROMORPHONE HYDROCHLORIDE 4 MG: 2 TABLET ORAL at 03:54

## 2024-12-20 RX ADMIN — METHOCARBAMOL 500 MG: 500 TABLET ORAL at 09:00

## 2024-12-20 RX ADMIN — PREGABALIN 25 MG: 25 CAPSULE ORAL at 09:00

## 2024-12-20 RX ADMIN — POLYETHYLENE GLYCOL 3350 17 G: 17 POWDER, FOR SOLUTION ORAL at 09:00

## 2024-12-20 RX ADMIN — METOPROLOL SUCCINATE 25 MG: 25 TABLET, EXTENDED RELEASE ORAL at 09:00

## 2024-12-20 RX ADMIN — SENNOSIDES AND DOCUSATE SODIUM 1 TABLET: 50; 8.6 TABLET ORAL at 09:00

## 2024-12-20 RX ADMIN — SENNOSIDES 8.6 MG: 8.6 TABLET, FILM COATED ORAL at 00:13

## 2024-12-20 RX ADMIN — METHOCARBAMOL 500 MG: 500 TABLET ORAL at 14:04

## 2024-12-20 ASSESSMENT — ACTIVITIES OF DAILY LIVING (ADL)
ADLS_ACUITY_SCORE: 46

## 2024-12-20 NOTE — PROGRESS NOTES
"[facility] Orthopaedics Progress Note      Post-operative Day: 4 Day Post-Op    Procedure(s):  LEFT LUMBAR 3 - LUMBAR 4 TRANSFORAMINAL LUMBAR INTERBODY FUSION WITH STEALTH NAVIGATION  Bilateral Lumbar 3-4 decompression with total left-sided facetectomy      Subjective: Patient seen up resting in bed. Pain has been tolerable on PO analgesics, slightly improved from yesterday. Is tolerating a regular diet with no nausea or vomiting. Having Bms. Feeling ready to discharge home today     Pain: moderate  Chest pain, SOB:  No    Objective:  Blood pressure 123/59, pulse 77, temperature 97.5  F (36.4  C), temperature source Oral, resp. rate 16, height 1.854 m (6' 1\"), weight 129.3 kg (285 lb), SpO2 94%.    Patient Vitals for the past 24 hrs:   BP Temp Temp src Pulse Resp SpO2   12/20/24 0736 123/59 97.5  F (36.4  C) Oral 77 16 94 %   12/20/24 0003 106/57 98.4  F (36.9  C) Oral 81 16 91 %   12/19/24 1702 131/60 97.4  F (36.3  C) Oral 94 16 92 %       Wt Readings from Last 4 Encounters:   12/16/24 129.3 kg (285 lb)   04/19/24 134.7 kg (297 lb)   04/03/24 134.7 kg (297 lb)   03/08/24 131.5 kg (290 lb)       General: Alert and orientated, NAD  Respiratory: Non-labored breathing on RA  BLE motor function, sensation, and circulation intact   Yes and at baseline Dorsiflexion/plantarflexion intact  right 4/5, left 5/5. Reports this is baseline Moves all other extremities with ease and purpose   Wound status: incisions are clean dry and intact. Yes,   Calf tenderness: Bilateral  No    Pertinent Labs   Lab Results: personally reviewed.     Recent Labs   Lab Test 12/17/24  0539   WBC 10.7   HGB 12.4*   HCT 35.6*   MCV 89             Plan:   Anticoagulation protocol: Mechanical and/or ambulation     Pain medications:  Multimodal approach with ice,  oxycodone rotated to Dilaudid and tylenol, will add Robaxin.. Lyrica  Weight bearing status:  WBAT, strict no heavy lifting, twisting, or bending requirements for the next 3 " months   Disposition:  Home pending adequate pain control on PO analgesics cleared by therapies and hospitalist. Plan for home PT. Anticipate discontinue home this afternoon.  Continue cares and rehabilitation     Report completed by:  NOÉ Waldron CNP  Date: 12/20/2024  Time: 11:45 AM

## 2024-12-20 NOTE — PROGRESS NOTES
Care Management Discharge Note    Discharge Date: 12/20/2024    Discharge Disposition: Home, Home Care    Discharge Services: None    Discharge DME: None    Discharge Transportation: family or friend will provide    Private pay costs discussed: Not applicable    Does the patient's insurance plan have a 3 day qualifying hospital stay waiver?  No    PAS Confirmation Code:    Patient/family educated on Medicare website which has current facility and service quality ratings: no    Education Provided on the Discharge Plan: Yes  Persons Notified of Discharge Plans: yes  Patient/Family in Agreement with the Plan: yes    Handoff Referral Completed: yes    Additional Information:  12:11 PM  Pt discharging home with HC PT. No additional CM needs identified or desired. Family to transport.     ANNETTA Monroy

## 2024-12-20 NOTE — PLAN OF CARE
"  Problem: Spinal Surgery  Goal: Optimal Pain Control and Function  Outcome: Progressing   Goal Outcome Evaluation:                      /57 (BP Location: Right arm)   Pulse 81   Temp 98.4  F (36.9  C) (Oral)   Resp 16   Ht 1.854 m (6' 1\")   Wt 129.3 kg (285 lb)   SpO2 91%   BMI 37.60 kg/m      Patient vital signs are at baseline: Yes  Patient able to ambulate as they were prior to admission or with assist devices provided by therapies during their stay:  Yes  Patient MUST void prior to discharge:  Yes  Patient able to tolerate oral intake:  Yes  Pain has adequate pain control using Oral analgesics:  Yes  Does patient have an identified :  Yes  Has goal D/C date and time been discussed with patient:  Yes    Leroy Wilcox RN      "

## 2024-12-20 NOTE — PLAN OF CARE
DISCHARGE LOUNGE NOTE    Patient discharged to home at 2:59 PM via wheel chair. Accompanied by significant other and staff. Discharge instructions reviewed with patient and spouse, opportunity offered to ask questions. Prescriptions sent to patients preferred pharmacy. All belongings sent with patient. Orthopedic stoplight tool reviewed and verbal understanding given. Wife to transport.     Kris Little RN

## 2024-12-20 NOTE — PROGRESS NOTES
"[facility]    Medicine Progress Note - Hospitalist Service    Date of Admission:  12/16/2024    Assessment & Plan   Yash Ribera is a 64 year old male admitted on 12/16/2024.  He has a past medical history significant for COPD, cardiac arrhythmia who presents to the hospital for elective L3-L4 lumbar fusion. Hospitalist medicine consulted for management of chronic medical additions. Hospital course complicated with postoperative pain. Pain team consulted. 12/20, pain appears to be better controlled over the last 24 hours.  No concerns about possible discharge on 12/20 if pain is well-controlled.    COPD  No signs of exacerbation    Plan  Continue home inhalers.    Cardiac arrhythmia    Plan  Continue home metoprolol with holding parameters    Status post L3-L4 fusion  Pain is not well-controlled.    Plan  DVT prophylaxis and pain management as per primary team and pain team              Diet: Regular Diet Adult  Discharge Instruction - Regular Diet Adult    DVT Prophylaxis: Defer to primary service  Black Catheter: Not present  Lines: None     Cardiac Monitoring: None  Code Status: Full Code      Clinically Significant Risk Factors                              # Obesity: Estimated body mass index is 37.6 kg/m  as calculated from the following:    Height as of this encounter: 1.854 m (6' 1\").    Weight as of this encounter: 129.3 kg (285 lb)., PRESENT ON ADMISSION            Social Drivers of Health    Tobacco Use: Medium Risk (12/16/2024)    Patient History     Smoking Tobacco Use: Former     Smokeless Tobacco Use: Never   Alcohol Use: Unknown (4/29/2019)    [facility]     Frequency of Alcohol Consumption: 4 or more times a week          Disposition Plan     Medically Ready for Discharge: Anticipated Today             EFRAÍN ENGLAND MD  Hospitalist Service  [facility]  Securely message with [employer] (more info)  Text " page via [employer] Paging/Directory   ______________________________________________________________________    Interval History   Feeling better.  Stated that the pain is better controlled when he is laying down in bed. Continues to experience some intermittent sharp, burning pain radiating to his bilateral lower extremities.  Similar to the pain that he used to experience before his surgical intervention.  He denies any chest pain shortness of breath.    Physical Exam   Vital Signs: Temp: 97.5  F (36.4  C) Temp src: Oral BP: 123/59 Pulse: 77   Resp: 16 SpO2: 94 % O2 Device: None (Room air)    Weight: 285 lbs 0 oz    Physical Exam  Constitutional:       General: He is not in acute distress.     Appearance: He is not ill-appearing or toxic-appearing.   Pulmonary:      Effort: Pulmonary effort is normal. No respiratory distress.      Breath sounds: Normal breath sounds.   Skin:     General: Skin is warm and dry.   Neurological:      Mental Status: He is alert.   Psychiatric:         Mood and Affect: Mood normal.          Medical Decision Making       44 MINUTES SPENT BY ME on the date of service doing chart review, history, exam, documentation & further activities per the note.      Data         Imaging results reviewed over the past 24 hrs:   No results found for this or any previous visit (from the past 24 hours).

## 2024-12-20 NOTE — DISCHARGE SUMMARY
[facility] Orthopedics Discharge Summary                                  [facility]     ALONDRA LOUIS [id number]   Age: 64 year old  PCP: Pérez Xavier, [phone] 1960     Date of Admission:  12/16/2024  Date of Discharge::  12/20/2024  Discharge Provider:  NOÉ Waldron CNP    Code status:  Full Code    Admission Information:  Admission Diagnosis:  Low back pain [M54.50]  Spondylolisthesis of lumbar region [M43.16]  Stenosis, spinal, lumbar [M48.061]    Post-Operative Day: 4 Days Post-Op     Reason for admission:  The patient was admitted for the following:Procedure(s) (LRB):  LEFT LUMBAR 3 - LUMBAR 4 TRANSFORAMINAL LUMBAR INTERBODY FUSION WITH STEALTH NAVIGATION (Left)  Bilateral Lumbar 3-4 decompression with total left-sided facetectomy (Bilateral)    Active Problems:    Lumbar radiculopathy      Allergies:  Wellbutrin [bupropion], Bee venom, Bees, Gabapentin, Pollen extract, Shrimp, and Shrimp (diagnostic)    Following the procedure noted above the patient was transferred to the post-op floor and started on:    Therapy:  physical therapy and occupational therapy  Anticoagulation Plan: Mechanical and/or ambulation     Pain Management: scopainmedication: oxycodone, tylenol, and Robaxin,Lyrica  Weight bearing status: Weight bearing as tolerated     The patient was followed by Orthopedics during the inpatient treatment course:  Complications:  None  Additional consultations:  Hospitalist/Pain team      Pertinent Labs   Lab Results: personally reviewed.     Recent Labs   Lab Test 12/18/24  0803 12/17/24  0539   WBC 8.2 10.7   HGB 13.2* 12.4*   HCT 39.0* 35.6*   MCV 91 89    247   NA  --  137          Discharge Information:  Condition at discharge: Stable  Discharge destination:  Discharged to home     Medications at discharge:  Current Discharge Medication List        START taking these medications    Details   HYDROmorphone (DILAUDID) 2 MG tablet Take 1-2 tablets (2-4 mg)  by mouth every 4 hours as needed for severe pain (2 mg for moderate pain, 4 mg for severe pain).  Qty: 30 tablet, Refills: 0    Associated Diagnoses: Lumbar radiculopathy      methocarbamol (ROBAXIN) 500 MG tablet Take 1 tablet (500 mg) by mouth 4 times daily for 14 days.  Qty: 56 tablet, Refills: 0    Associated Diagnoses: Lumbar radiculopathy      pregabalin (LYRICA) 25 MG capsule Take 1 capsule (25 mg) by mouth 2 times daily.  Qty: 60 capsule, Refills: 0    Associated Diagnoses: Lumbar radiculopathy      senna-docusate (SENOKOT-S/PERICOLACE) 8.6-50 MG tablet Take 1 tablet by mouth 2 times daily.  Qty: 42 tablet, Refills: 0    Associated Diagnoses: Lumbar radiculopathy           CONTINUE these medications which have NOT CHANGED    Details   acetaminophen (TYLENOL) 325 MG tablet Take 325-650 mg by mouth every 4 hours as needed for pain      albuterol (PROAIR HFA/PROVENTIL HFA/VENTOLIN HFA) 108 (90 Base) MCG/ACT inhaler Inhale 2 puffs into the lungs every 4 hours as needed for shortness of breath, wheezing or cough.      Fluticasone-Umeclidin-Vilanterol (TRELEGY ELLIPTA) 200-62.5-25 MCG/ACT oral inhaler Inhale 1 puff into the lungs daily.      ipratropium - albuterol 0.5 mg/2.5 mg/3 mL (DUONEB) 0.5-2.5 (3) MG/3ML neb solution Take 1 vial by nebulization every 6 hours as needed for shortness of breath, wheezing or cough.      metoprolol succinate ER (TOPROL XL) 25 MG 24 hr tablet Take 1 tablet (25 mg) by mouth daily  Qty: 90 tablet, Refills: 3    Associated Diagnoses: NSVT (nonsustained ventricular tachycardia) (H); Paroxysmal supraventricular tachycardia (H)           STOP taking these medications       aspirin-acetaminophen-caffeine (EXCEDRIN MIGRAINE) 250-250-65 MG tablet Comments:   Reason for Stopping:         ibuprofen (ADVIL/MOTRIN) 200 MG tablet Comments:   Reason for Stopping:                          Follow-Up Care:  Patient should be seen in the office in 14 days by the Orthopedic Surgeon/Physician  Assistant.  Call [phone] for appointment or questions.    It was my pleasure to take care of the above patient.  NOÉ Waldron CNP

## 2024-12-20 NOTE — PROGRESS NOTES
"[facility] ACUTE PAIN SERVICE    [facility], [facility], [facility], [facility], [place]   PAIN Progress Note    Assessment/Plan:  Yash Ribera is a 64 year old male who was admitted on 12/16/2024.  Pain team was asked to see the patient for post op pain. Admitted for planned procedure. History of COPD, cardiac arrhythmia.       Post op day: 4 Days Post-Op. L3-L4 lumbar fusion     Plans for discharge, pain team will sign off      PLAN:   1) Pain is consistent with post op pian.   Multimodal Medication Therapy  Topical: lidocaine ointment qid   NSAID'S: none post fusion   Muscle Relaxants: robaxin 500 mg qid   Adjuvants: APAP 975 mg q6h, add Lyrica 25 mg bid 12/19/24 (allergies noted to Gabapentin)   Opioids: dilaudid 2-4 mg q4hprn   IV Pain medication: dilaudid 0.2-0.4 mg q2hprn - discontinue   Non-medication interventions: Ice, Rest, PT, OT, Distraction (TV, Music, Reading), and Meditation  Constipation Prophylaxis: Bisacodyl Suppository, Senna-docusate, and Miralax     -Opioid prescriber has been none  -[place]  pulled from system on 12/19/24. This indicates none  Discharge Recommendations - We recommend prescribing the following at the time of discharge: Per Orthopedics    Subjective:  Describes pain as 4-6/10 and aching in the low back and does radiate to BLEs. The patient denies nausea, vomiting, constipation, diarrhea, chest pain, shortness of breath, dizziness, fever, chills, and saddle anesthesia. Hopes to discharge today.     Principal Problem:  <principal problem not specified>     Patient Active Problem List   Diagnosis    Lumbar radiculopathy        Objective:    History   Drug Use Unknown          Tobacco Use      Smoking status: Former        Types: Cigarettes      Smokeless tobacco: Never      Vital signs in last 24 hours:  /59 (BP Location: Right arm)   Pulse 77   Temp 97.5  F (36.4  C) (Oral)   Resp 16   Ht 1.854 m (6' 1\")   Wt 129.3 kg (285 lb)   SpO2 94%   BMI 37.60 kg/m  "     Weight:     Vitals:    12/16/24 0635   Weight: 129.3 kg (285 lb)      Weight change:   Body mass index is 37.6 kg/m .    Intake/Output last 3 shifts:  I/O last 3 completed shifts:  In: 690 [P.O.:690]  Out: -   Intake/Output this shift:  I/O this shift:  In: 450 [P.O.:450]  Out: 750 [Urine:750]    Review of Systems:   As per subjective, all others negative.    Physical Exam:  General Appearance:  Alert, cooperative, no distress   Head:  Normocephalic, without obvious abnormality, atraumatic   Eyes:  PERRL, conjunctiva/corneas clear, EOM's intact   Nose: Nares normal, septum midline   Throat: Lips, mucosa, and tongue normal; teeth and gums normal   Neck: Supple, symmetrical, trachea midline   Back:   Incision is covered    Lungs:   Clear to auscultation bilaterally, respirations unlabored   Heart:  Regular rate and rhythm, S1, S2 normal    Abdomen:   Soft, non-tender   Extremities: Extremities normal, atraumatic   Skin: Skin warm, dry    Neurologic: Alert and oriented X 3, Moves all 4 extremities   Psych: Affect is appropriate      Imaging: Reviewed I have personally reviewed pertinent notes, labs, tests, and radiologic imaging in patient's chart.  Labs: Reviewed I have personally reviewed pertinent notes, labs, tests, and radiologic imaging in patient's chart.  Notes: Reviewed I have personally reviewed pertinent notes, labs, tests, and radiologic imaging in patient's chart.    Total time spent 35 minutes with greater than 50% in consultation, education and coordination of care.   Also discussed with RN and Orthopedics.   Treatment plan includes: multimodal pain approach, Hospital Medicine Service for medical management, Orthopedics, PT, OT.   Patient educated regarding: multimodal pain approach and medications as listed above.   Elements of Medical Decision Making as described above. Acute or chronic illness or injury or surgery. High risk therapy including opioids, high risk drug therapy including oral and/or  parenteral controlled substances.    Patient is understanding of the plan. All questions and concerns addressed to patient's satisfaction.     Migdalia UMANZOR, FNP-C  Acute Care Inpatient Pain Management Program  [facility] ([facility])  Hours of coverage Monday-Friday [id number]. After hours please contact Primary team.   Page via Epic chat or [employer]

## 2025-01-30 ENCOUNTER — DOCUMENTATION ONLY (OUTPATIENT)
Dept: CARDIAC REHAB | Facility: CLINIC | Age: 65
End: 2025-01-30
Payer: COMMERCIAL

## 2025-01-30 DIAGNOSIS — R26.89 IMPAIRED GAIT AND MOBILITY: Primary | ICD-10-CM

## 2025-03-18 DIAGNOSIS — I47.29 NSVT (NONSUSTAINED VENTRICULAR TACHYCARDIA) (H): ICD-10-CM

## 2025-03-18 DIAGNOSIS — I47.10 PAROXYSMAL SUPRAVENTRICULAR TACHYCARDIA: ICD-10-CM

## 2025-03-19 RX ORDER — METOPROLOL SUCCINATE 25 MG/1
25 TABLET, EXTENDED RELEASE ORAL DAILY
Qty: 30 TABLET | Refills: 0 | Status: SHIPPED | OUTPATIENT
Start: 2025-03-19

## 2025-03-19 NOTE — TELEPHONE ENCOUNTER
Noted pt had OV with MAT 4/2024 and is due for yrly follow-up 4/2025. Order placed for follow-up and msg sent to scheduling to arrange. aa

## 2025-04-13 ENCOUNTER — HEALTH MAINTENANCE LETTER (OUTPATIENT)
Age: 65
End: 2025-04-13

## 2025-04-21 ENCOUNTER — TRANSFERRED RECORDS (OUTPATIENT)
Dept: HEALTH INFORMATION MANAGEMENT | Facility: CLINIC | Age: 65
End: 2025-04-21
Payer: COMMERCIAL

## 2025-05-07 ENCOUNTER — OFFICE VISIT (OUTPATIENT)
Dept: CARDIOLOGY | Facility: CLINIC | Age: 65
End: 2025-05-07
Attending: INTERNAL MEDICINE
Payer: COMMERCIAL

## 2025-05-07 VITALS
DIASTOLIC BLOOD PRESSURE: 74 MMHG | RESPIRATION RATE: 14 BRPM | WEIGHT: 298 LBS | SYSTOLIC BLOOD PRESSURE: 114 MMHG | BODY MASS INDEX: 39.32 KG/M2 | HEART RATE: 69 BPM

## 2025-05-07 DIAGNOSIS — I47.29 NSVT (NONSUSTAINED VENTRICULAR TACHYCARDIA) (H): ICD-10-CM

## 2025-05-07 DIAGNOSIS — I49.3 SYMPTOMATIC PVCS: Primary | ICD-10-CM

## 2025-05-07 DIAGNOSIS — I47.10 PAROXYSMAL SUPRAVENTRICULAR TACHYCARDIA: ICD-10-CM

## 2025-05-07 DIAGNOSIS — R06.09 EXERTIONAL DYSPNEA: ICD-10-CM

## 2025-05-07 DIAGNOSIS — R00.2 PALPITATIONS: ICD-10-CM

## 2025-05-07 PROCEDURE — 3074F SYST BP LT 130 MM HG: CPT | Performed by: INTERNAL MEDICINE

## 2025-05-07 PROCEDURE — 99214 OFFICE O/P EST MOD 30 MIN: CPT | Performed by: INTERNAL MEDICINE

## 2025-05-07 PROCEDURE — 3078F DIAST BP <80 MM HG: CPT | Performed by: INTERNAL MEDICINE

## 2025-05-07 RX ORDER — METOPROLOL SUCCINATE 25 MG/1
25 TABLET, EXTENDED RELEASE ORAL DAILY
Qty: 90 TABLET | Refills: 3 | Status: SHIPPED | OUTPATIENT
Start: 2025-05-07

## 2025-05-07 NOTE — LETTER
5/7/2025    Joey Chan MD  [facility] [address] [place] [id number]    RE: Yash Ribera       Dear Colleague,     I had the pleasure of seeing Yash Ribera in the [facility].    HEART CARE ENCOUNTER CONSULTATON NOTE      [facility]  [phone]      Assessment/Recommendations   Assessment:   1.  Nonsustained ventricular tachycardia on monitor 4 beats  2.  Atrial tachycardia, short duration of monitor  3.  Symptomatic PVCs  4.  Diaphragmatic paralysis  5.  COPD  6.  Chronic back pain, requiring use of a wheeled walker    Plan:   Continue metoprolol for symptomatic relief of PVCs.         History of Present Illness/Subjective    HPI: Yash Ribera is a 64 year old male who presents to cardiology clinic in follow-up for PVCs, palpitations.    Patient went through a very extensive workup in 2024 for dyspnea on exertion and chest pain symptoms along with PVCs.  Coronary CT angiogram demonstrated no obstructive coronary disease.  Echocardiogram demonstrated no significant valvular or significant dysfunction in the left right ventricle.    He was started on metoprolol which improved his palpitations.  He was evaluated by pulmonary medicine for COPD and diaphragmatic paralysis.  Which with current therapies have stabilized his disease.  He still has moderate to severe dyspnea on exertion with any level of prolonged activity.  He has no significant lower extremity orthopnea or PND symptoms.  No syncopal or near syncopal episodes.    Continues to deal with chronic back pain.    Echocardiogram Results:   1. Technically difficult study.  2. The left ventricle is normal in size. Image quality does not provide for  detailed assessment of LV systolic function, but is felt likely to be normal  with a visually estimated ejection fraction of roughly 55-60%.  3. No significant valvular heart disease is identified on this study though  the sensitivity, particularly of  regurgitant lesions, is reduced due to poor  Doppler acoustics.  4. Probable normal right ventricular size and systolic performance though  right-sided structures are not clearly visualized on all views on this study.          Physical Examination  Review of Systems   Vitals: /74 (BP Location: Right arm, Patient Position: Sitting, Cuff Size: Adult Large)   Pulse 69   Resp 14   Wt 135.2 kg (298 lb)   BMI 39.32 kg/m    BMI= Body mass index is 39.32 kg/m .  Wt Readings from Last 3 Encounters:   05/07/25 135.2 kg (298 lb)   12/16/24 129.3 kg (285 lb)   04/19/24 134.7 kg (297 lb)        Pleasant, using wheeled walker, obesity noted   ENT/Mouth: membranes moist, no oral lesions or bleeding gums.      EYES:  no scleral icterus, normal conjunctivae           Cardiovascular:   Regular.  no murmurs, rubs, or gallops; the carotid,.  No edema   Abdomen:  no tenderness; bowel sounds are present   Extremities: no cyanosis or clubbing   Skin: no xanthelasma, warm.    Neurologic: normal  bilateral, no tremors     Psychiatric: alert and oriented x3, calm        Please refer above for cardiac ROS details.        Medical History  Surgical History Family History Social History   Past Medical History:   Diagnosis Date     Arrhythmia      Back pain      Emphysema lung (H)     COPD     Gastroesophageal reflux disease with esophagitis      Irregular heart beat      Paralysis of diaphragm     left side partial     Past Surgical History:   Procedure Laterality Date     BACK SURGERY       FUSION TRANSFORAMINAL LUMBAR INTERBODY, 1 LEVEL, POSTERIOR APPROACH, USING OTS SURG IMAGING GUIDANCE Left 12/16/2024    Procedure: LEFT LUMBAR 3 - LUMBAR 4 TRANSFORAMINAL LUMBAR INTERBODY FUSION WITH STEALTH NAVIGATION;  Surgeon: Travis Coyle MD;  Location: [facility] Main OR     LAMINECTOMY, SPINE, LUMBAR, MINIMALLY INVASIVE, 1 LEVEL, USING OPTICAL TRACKING SYSTEM Bilateral 12/16/2024    Procedure: Bilateral Lumbar 3-4  decompression with total left-sided facetectomy;  Surgeon: Travis Coyle MD;  Location: [facility] Main OR     NECK SURGERY       ORTHOPEDIC SURGERY       No family history on file.     Social History     Socioeconomic History     Marital status:      Spouse name: Not on file     Number of children: Not on file     Years of education: Not on file     Highest education level: Not on file   Occupational History     Not on file   Tobacco Use     Smoking status: Former     Types: Cigarettes     Smokeless tobacco: Never   Vaping Use     Vaping status: Never Used   Substance and Sexual Activity     Alcohol use: Yes     Comment: 3 times/week     Drug use: Never     Sexual activity: Not on file   Other Topics Concern     Not on file   Social History Narrative     Not on file     Social Drivers of Health     Financial Resource Strain: Low Risk  (12/16/2024)    Financial Resource Strain      Within the past 12 months, have you or your family members you live with been unable to get utilities (heat, electricity) when it was really needed?: No   Food Insecurity: Low Risk  (12/16/2024)    Food Insecurity      Within the past 12 months, did you worry that your food would run out before you got money to buy more?: No      Within the past 12 months, did the food you bought just not last and you didn t have money to get more?: No   Transportation Needs: Low Risk  (12/16/2024)    Transportation Needs      Within the past 12 months, has lack of transportation kept you from medical appointments, getting your medicines, non-medical meetings or appointments, work, or from getting things that you need?: No   Physical Activity: Not on file   Stress: Not on file   Social Connections: Not on file   Interpersonal Safety: Low Risk  (12/18/2024)    Interpersonal Safety      Do you feel physically and emotionally safe where you currently live?: Yes      Within the past 12 months, have you been hit, slapped, kicked or otherwise  "physically hurt by someone?: No      Within the past 12 months, have you been humiliated or emotionally abused in other ways by your partner or ex-partner?: No   Housing Stability: Low Risk  (12/16/2024)    Housing Stability      Do you have housing? : Yes      Are you worried about losing your housing?: No           Medications  Allergies   Current Outpatient Medications   Medication Sig Dispense Refill     acetaminophen (TYLENOL) 325 MG tablet Take 325-650 mg by mouth every 4 hours as needed for pain       albuterol (PROAIR HFA/PROVENTIL HFA/VENTOLIN HFA) 108 (90 Base) MCG/ACT inhaler Inhale 2 puffs into the lungs every 4 hours as needed for shortness of breath, wheezing or cough.       Fluticasone-Umeclidin-Vilanterol (TRELEGY ELLIPTA) 200-62.5-25 MCG/ACT oral inhaler Inhale 1 puff into the lungs daily.       ipratropium - albuterol 0.5 mg/2.5 mg/3 mL (DUONEB) 0.5-2.5 (3) MG/3ML neb solution Take 1 vial by nebulization every 6 hours as needed for shortness of breath, wheezing or cough.       metoprolol succinate ER (TOPROL XL) 25 MG 24 hr tablet Take 1 tablet (25 mg) by mouth daily. Please arrange follow-up with cardiology for further refills. 30 tablet 0     HYDROmorphone (DILAUDID) 2 MG tablet Take 1-2 tablets (2-4 mg) by mouth every 4 hours as needed for severe pain (2 mg for moderate pain, 4 mg for severe pain). 30 tablet 0     pregabalin (LYRICA) 25 MG capsule Take 1 capsule (25 mg) by mouth 2 times daily. 60 capsule 0     senna-docusate (SENOKOT-S/PERICOLACE) 8.6-50 MG tablet Take 1 tablet by mouth 2 times daily. 42 tablet 0       Allergies   Allergen Reactions     Wellbutrin [Bupropion] Hives     Bee Venom      Other Reaction(s): Edema,generalized     Bees      Gabapentin      Feet swelling     Pollen Extract      Shrimp      Shrimp (Diagnostic) Unknown          Lab Results    Chemistry/lipid CBC Cardiac Enzymes/BNP/TSH/INR   No results for input(s): \"CHOL\", \"HDL\", \"LDL\", \"TRIG\", \"CHOLHDLRATIO\" in the last " "67652 hours.  No results for input(s): \"LDL\" in the last 82788 hours.  No results for input(s): \"NA\", \"POTASSIUM\", \"CHLORIDE\", \"CO2\", \"GLC\", \"BUN\", \"CR\", \"GFRESTIMATED\", \"DAIN\" in the last 71300 hours.    Invalid input(s): \"GRFESTBLACK\"  No results for input(s): \"CR\" in the last 88324 hours.  No results for input(s): \"A1C\" in the last 46665 hours.       No results for input(s): \"WBC\", \"HGB\", \"HCT\", \"MCV\", \"PLT\" in the last 66635 hours.  No results for input(s): \"HGB\" in the last 83658 hours. No results for input(s): \"TROPONINI\" in the last 39096 hours.  No results for input(s): \"BNP\", \"NTBNPI\", \"NTBNP\" in the last 61434 hours.  No results for input(s): \"TSH\" in the last 48444 hours.  No results for input(s): \"INR\" in the last 59445 hours.     Travis Alexandre DO                              Thank you for allowing me to participate in the care of your patient.      Sincerely,     Travis Alexandre DO     [facility]  cc:   Travis Alexandre DO  [address] [place] [address]      "

## 2025-05-07 NOTE — PROGRESS NOTES
HEART CARE ENCOUNTER CONSULTATON NOTE      [facility]  [phone]      Assessment/Recommendations   Assessment:   1.  Nonsustained ventricular tachycardia on monitor 4 beats  2.  Atrial tachycardia, short duration of monitor  3.  Symptomatic PVCs  4.  Diaphragmatic paralysis  5.  COPD  6.  Chronic back pain, requiring use of a wheeled walker    Plan:   Continue metoprolol for symptomatic relief of PVCs.         History of Present Illness/Subjective    HPI: Yash Ribera is a 64 year old male who presents to cardiology clinic in follow-up for PVCs, palpitations.    Patient went through a very extensive workup in 2024 for dyspnea on exertion and chest pain symptoms along with PVCs.  Coronary CT angiogram demonstrated no obstructive coronary disease.  Echocardiogram demonstrated no significant valvular or significant dysfunction in the left right ventricle.    He was started on metoprolol which improved his palpitations.  He was evaluated by pulmonary medicine for COPD and diaphragmatic paralysis.  Which with current therapies have stabilized his disease.  He still has moderate to severe dyspnea on exertion with any level of prolonged activity.  He has no significant lower extremity orthopnea or PND symptoms.  No syncopal or near syncopal episodes.    Continues to deal with chronic back pain.    Echocardiogram Results:   1. Technically difficult study.  2. The left ventricle is normal in size. Image quality does not provide for  detailed assessment of LV systolic function, but is felt likely to be normal  with a visually estimated ejection fraction of roughly 55-60%.  3. No significant valvular heart disease is identified on this study though  the sensitivity, particularly of regurgitant lesions, is reduced due to poor  Doppler acoustics.  4. Probable normal right ventricular size and systolic performance though  right-sided structures are not clearly visualized on all views on this study.           Physical Examination  Review of Systems   Vitals: /74 (BP Location: Right arm, Patient Position: Sitting, Cuff Size: Adult Large)   Pulse 69   Resp 14   Wt 135.2 kg (298 lb)   BMI 39.32 kg/m    BMI= Body mass index is 39.32 kg/m .  Wt Readings from Last 3 Encounters:   05/07/25 135.2 kg (298 lb)   12/16/24 129.3 kg (285 lb)   04/19/24 134.7 kg (297 lb)        Pleasant, using wheeled walker, obesity noted   ENT/Mouth: membranes moist, no oral lesions or bleeding gums.      EYES:  no scleral icterus, normal conjunctivae           Cardiovascular:   Regular.  no murmurs, rubs, or gallops; the carotid,.  No edema   Abdomen:  no tenderness; bowel sounds are present   Extremities: no cyanosis or clubbing   Skin: no xanthelasma, warm.    Neurologic: normal  bilateral, no tremors     Psychiatric: alert and oriented x3, calm        Please refer above for cardiac ROS details.        Medical History  Surgical History Family History Social History   Past Medical History:   Diagnosis Date    Arrhythmia     Back pain     Emphysema lung (H)     COPD    Gastroesophageal reflux disease with esophagitis     Irregular heart beat     Paralysis of diaphragm     left side partial     Past Surgical History:   Procedure Laterality Date    BACK SURGERY      FUSION TRANSFORAMINAL LUMBAR INTERBODY, 1 LEVEL, POSTERIOR APPROACH, USING OTS SURG IMAGING GUIDANCE Left 12/16/2024    Procedure: LEFT LUMBAR 3 - LUMBAR 4 TRANSFORAMINAL LUMBAR INTERBODY FUSION WITH STEALTH NAVIGATION;  Surgeon: Travis Coyle MD;  Location: [facility] OR    LAMINECTOMY, SPINE, LUMBAR, MINIMALLY INVASIVE, 1 LEVEL, USING OPTICAL TRACKING SYSTEM Bilateral 12/16/2024    Procedure: Bilateral Lumbar 3-4 decompression with total left-sided facetectomy;  Surgeon: Travis Coyle MD;  Location: [facility] OR    NECK SURGERY      ORTHOPEDIC SURGERY       No family history on file.     Social History     Socioeconomic History     Marital status:      Spouse name: Not on file    Number of children: Not on file    Years of education: Not on file    Highest education level: Not on file   Occupational History    Not on file   Tobacco Use    Smoking status: Former     Types: Cigarettes    Smokeless tobacco: Never   Vaping Use    Vaping status: Never Used   Substance and Sexual Activity    Alcohol use: Yes     Comment: 3 times/week    Drug use: Never    Sexual activity: Not on file   Other Topics Concern    Not on file   Social History Narrative    Not on file     Social Drivers of Health     Financial Resource Strain: Low Risk  (12/16/2024)    Financial Resource Strain     Within the past 12 months, have you or your family members you live with been unable to get utilities (heat, electricity) when it was really needed?: No   Food Insecurity: Low Risk  (12/16/2024)    Food Insecurity     Within the past 12 months, did you worry that your food would run out before you got money to buy more?: No     Within the past 12 months, did the food you bought just not last and you didn t have money to get more?: No   Transportation Needs: Low Risk  (12/16/2024)    Transportation Needs     Within the past 12 months, has lack of transportation kept you from medical appointments, getting your medicines, non-medical meetings or appointments, work, or from getting things that you need?: No   Physical Activity: Not on file   Stress: Not on file   Social Connections: Not on file   Interpersonal Safety: Low Risk  (12/18/2024)    Interpersonal Safety     Do you feel physically and emotionally safe where you currently live?: Yes     Within the past 12 months, have you been hit, slapped, kicked or otherwise physically hurt by someone?: No     Within the past 12 months, have you been humiliated or emotionally abused in other ways by your partner or ex-partner?: No   Housing Stability: Low Risk  (12/16/2024)    Housing Stability     Do you have housing? : Yes      "Are you worried about losing your housing?: No           Medications  Allergies   Current Outpatient Medications   Medication Sig Dispense Refill    acetaminophen (TYLENOL) 325 MG tablet Take 325-650 mg by mouth every 4 hours as needed for pain      albuterol (PROAIR HFA/PROVENTIL HFA/VENTOLIN HFA) 108 (90 Base) MCG/ACT inhaler Inhale 2 puffs into the lungs every 4 hours as needed for shortness of breath, wheezing or cough.      Fluticasone-Umeclidin-Vilanterol (TRELEGY ELLIPTA) 200-62.5-25 MCG/ACT oral inhaler Inhale 1 puff into the lungs daily.      ipratropium - albuterol 0.5 mg/2.5 mg/3 mL (DUONEB) 0.5-2.5 (3) MG/3ML neb solution Take 1 vial by nebulization every 6 hours as needed for shortness of breath, wheezing or cough.      metoprolol succinate ER (TOPROL XL) 25 MG 24 hr tablet Take 1 tablet (25 mg) by mouth daily. Please arrange follow-up with cardiology for further refills. 30 tablet 0    HYDROmorphone (DILAUDID) 2 MG tablet Take 1-2 tablets (2-4 mg) by mouth every 4 hours as needed for severe pain (2 mg for moderate pain, 4 mg for severe pain). 30 tablet 0    pregabalin (LYRICA) 25 MG capsule Take 1 capsule (25 mg) by mouth 2 times daily. 60 capsule 0    senna-docusate (SENOKOT-S/PERICOLACE) 8.6-50 MG tablet Take 1 tablet by mouth 2 times daily. 42 tablet 0       Allergies   Allergen Reactions    Wellbutrin [Bupropion] Hives    Bee Venom      Other Reaction(s): Edema,generalized    Bees     Gabapentin      Feet swelling    Pollen Extract     Shrimp     Shrimp (Diagnostic) Unknown          Lab Results    Chemistry/lipid CBC Cardiac Enzymes/BNP/TSH/INR   No results for input(s): \"CHOL\", \"HDL\", \"LDL\", \"TRIG\", \"CHOLHDLRATIO\" in the last 35329 hours.  No results for input(s): \"LDL\" in the last 98467 hours.  No results for input(s): \"NA\", \"POTASSIUM\", \"CHLORIDE\", \"CO2\", \"GLC\", \"BUN\", \"CR\", \"GFRESTIMATED\", \"DAIN\" in the last 99443 hours.    Invalid input(s): \"GRFESTBLACK\"  No results for input(s): \"CR\" in the " "last 96981 hours.  No results for input(s): \"A1C\" in the last 48369 hours.       No results for input(s): \"WBC\", \"HGB\", \"HCT\", \"MCV\", \"PLT\" in the last 92126 hours.  No results for input(s): \"HGB\" in the last 74773 hours. No results for input(s): \"TROPONINI\" in the last 31396 hours.  No results for input(s): \"BNP\", \"NTBNPI\", \"NTBNP\" in the last 89933 hours.  No results for input(s): \"TSH\" in the last 73063 hours.  No results for input(s): \"INR\" in the last 99955 hours.     Travis Alexandre, DO                            "

## (undated) DEVICE — IOM STANDARD SUPPLY FEE

## (undated) DEVICE — DRAPE C-ARM 60X42" 1013

## (undated) DEVICE — BLADE BONE MILL STRK MED 5420-MED-000

## (undated) DEVICE — CATH TRAY FOLEY SURESTEP 16FR DRAIN BAG STATOCK A899916

## (undated) DEVICE — CELL SAVER

## (undated) DEVICE — MARKER SPHERES PASSIVE MEDT PACK 5 8801075

## (undated) DEVICE — SUCTION CANISTER MEDIVAC LINER 3000ML W/LID 65651-530

## (undated) DEVICE — PACK 9X6IN THRP HOT COLD CMPR  MED GEL 80104

## (undated) DEVICE — SUTURE VICRYL+ 2-0 27IN CT-1 UND VCP259H

## (undated) DEVICE — SUCTION MANIFOLD NEPTUNE 2 SYS 1 PORT 702-025-000

## (undated) DEVICE — GLOVE UNDER INDICATOR PI SZ 7.0 LF 41670

## (undated) DEVICE — ELECTRODE PATIENT RETURN ADULT L10 FT 2 PLATE CORD 0855C

## (undated) DEVICE — SOL WATER IRRIG 1000ML BOTTLE 2F7114

## (undated) DEVICE — PACK MINOR SINGLE BASIN SSK3001

## (undated) DEVICE — SUTURE VICRYL+ 1 18 CT/CR  VLT VCP753D

## (undated) DEVICE — SU DERMABOND ADVANCED .7ML DNX12

## (undated) DEVICE — CLEANER CAUTERY TIP E2401

## (undated) DEVICE — SUCTION TIP YANKAUER W/O VENT K86

## (undated) DEVICE — SU SILK 2-0 FS-1 18" 685G

## (undated) DEVICE — SOL NACL 0.9% INJ 500ML BAG 2B1323Q

## (undated) DEVICE — CUSHION INSERT LG PRONE VIEW JACKSON TABLE

## (undated) DEVICE — SOL NACL 0.9% IRRIG 1000ML BOTTLE 2F7124

## (undated) DEVICE — GOWN LG DISP 9515

## (undated) DEVICE — CUSTOM PACK LUMBAR FUSION SNE5BLFHEA

## (undated) DEVICE — SU STRATAFIX MONOCRYL 3-0 SPIRAL PS-2 30CM SXMP1B106

## (undated) DEVICE — GLOVE BIOGEL PI SZ 7.5 40875

## (undated) DEVICE — POSITIONER ARM CRADLE LAMINECTOMY DISP

## (undated) DEVICE — ESU PENCIL SMOKE EVAC W/ROCKER SWITCH 0703-047-000

## (undated) DEVICE — STPL SKIN 35W 6.9MM  PXW35

## (undated) DEVICE — DRSG TEGADERM 2 3/8X2 3/4" 1624W

## (undated) DEVICE — GOWN XLG DISP 9545

## (undated) DEVICE — WRAP B-COOL TERI LUMBAR 08143380

## (undated) DEVICE — DRAPE BACK TABLE PADDED 60X90

## (undated) DEVICE — DRSG GAUZE 2X2" TRAY 1806

## (undated) DEVICE — DRAPE STERI TOWEL LG 1010

## (undated) DEVICE — DRSG TEGADERM 4X4 3/4" 1626W

## (undated) DEVICE — PROBE PEDICLE SCREW BALL TIP NEUROSIGN V4 4008-00

## (undated) DEVICE — TOOL DISSECT MIDAS MR8 14CM MATCH HEAD 3MM MR8-14MH30

## (undated) DEVICE — CATH IV 14GA 2IN REM FLASHPLUG DEHP-FR PVC FR 4251717-02

## (undated) DEVICE — ESU BIPOLAR SEALER AQUAMANTYS 6MM 23-112-1

## (undated) DEVICE — DRSG PRIMAPORE 03 1/8X6" 66000318

## (undated) DEVICE — TIP ASP SONOPET IQ 11CMX12.5MMX 0.83MM APEX 5500-25B-114

## (undated) DEVICE — RX SURGIFLO HEMOSTATIC MATRIX 8ML 2991

## (undated) DEVICE — Device

## (undated) DEVICE — IOM CASE FLAT FEE

## (undated) DEVICE — KIT DRAIN CLOSED WOUND SUCTION MED 400ML RESVR

## (undated) DEVICE — DRAPE SHEET REV FOLD 3/4 9349

## (undated) DEVICE — GLOVE BIOGEL PI ULTRATOUCH G SZ 6.5 42165

## (undated) DEVICE — CASSETTE SONOPET IRRIG SUCTION STRL 5500-573-000

## (undated) RX ORDER — PROPOFOL 10 MG/ML
INJECTION, EMULSION INTRAVENOUS
Status: DISPENSED
Start: 2024-12-16

## (undated) RX ORDER — LIDOCAINE HYDROCHLORIDE 10 MG/ML
INJECTION, SOLUTION EPIDURAL; INFILTRATION; INTRACAUDAL; PERINEURAL
Status: DISPENSED
Start: 2024-12-16

## (undated) RX ORDER — ONDANSETRON 2 MG/ML
INJECTION INTRAMUSCULAR; INTRAVENOUS
Status: DISPENSED
Start: 2024-12-16

## (undated) RX ORDER — DEXAMETHASONE SODIUM PHOSPHATE 10 MG/ML
INJECTION, EMULSION INTRAMUSCULAR; INTRAVENOUS
Status: DISPENSED
Start: 2024-12-16

## (undated) RX ORDER — PHENYLEPHRINE HYDROCHLORIDE 10 MG/ML
INJECTION INTRAVENOUS
Status: DISPENSED
Start: 2024-12-16

## (undated) RX ORDER — DEXAMETHASONE SODIUM PHOSPHATE 4 MG/ML
INJECTION, SOLUTION INTRA-ARTICULAR; INTRALESIONAL; INTRAMUSCULAR; INTRAVENOUS; SOFT TISSUE
Status: DISPENSED
Start: 2024-12-16

## (undated) RX ORDER — FENTANYL CITRATE 50 UG/ML
INJECTION, SOLUTION INTRAMUSCULAR; INTRAVENOUS
Status: DISPENSED
Start: 2024-03-08

## (undated) RX ORDER — FENTANYL CITRATE 50 UG/ML
INJECTION, SOLUTION INTRAMUSCULAR; INTRAVENOUS
Status: DISPENSED
Start: 2024-12-16